# Patient Record
Sex: FEMALE | Race: WHITE | NOT HISPANIC OR LATINO | Employment: FULL TIME | URBAN - METROPOLITAN AREA
[De-identification: names, ages, dates, MRNs, and addresses within clinical notes are randomized per-mention and may not be internally consistent; named-entity substitution may affect disease eponyms.]

---

## 2017-02-16 ENCOUNTER — PATIENT MESSAGE (OUTPATIENT)
Dept: FAMILY MEDICINE | Facility: CLINIC | Age: 32
End: 2017-02-16

## 2017-03-02 ENCOUNTER — PATIENT OUTREACH (OUTPATIENT)
Dept: ADMINISTRATIVE | Facility: HOSPITAL | Age: 32
End: 2017-03-02

## 2017-03-14 ENCOUNTER — OFFICE VISIT (OUTPATIENT)
Dept: FAMILY MEDICINE | Facility: CLINIC | Age: 32
End: 2017-03-14
Payer: COMMERCIAL

## 2017-03-14 VITALS
RESPIRATION RATE: 18 BRPM | SYSTOLIC BLOOD PRESSURE: 110 MMHG | TEMPERATURE: 99 F | HEART RATE: 100 BPM | DIASTOLIC BLOOD PRESSURE: 72 MMHG | WEIGHT: 161.63 LBS | BODY MASS INDEX: 27.59 KG/M2 | HEIGHT: 64 IN

## 2017-03-14 DIAGNOSIS — N92.6 MISSED MENSES: Primary | ICD-10-CM

## 2017-03-14 DIAGNOSIS — Z34.90 PREGNANCY, UNSPECIFIED GESTATIONAL AGE: ICD-10-CM

## 2017-03-14 LAB
B-HCG UR QL: POSITIVE
CTP QC/QA: YES

## 2017-03-14 PROCEDURE — 1160F RVW MEDS BY RX/DR IN RCRD: CPT | Mod: S$GLB,,, | Performed by: FAMILY MEDICINE

## 2017-03-14 PROCEDURE — 99999 PR PBB SHADOW E&M-EST. PATIENT-LVL III: CPT | Mod: PBBFAC,,, | Performed by: FAMILY MEDICINE

## 2017-03-14 PROCEDURE — 81025 URINE PREGNANCY TEST: CPT | Mod: S$GLB,,, | Performed by: FAMILY MEDICINE

## 2017-03-14 PROCEDURE — 99213 OFFICE O/P EST LOW 20 MIN: CPT | Mod: S$GLB,,, | Performed by: FAMILY MEDICINE

## 2017-03-14 RX ORDER — FOLIC ACID 0.8 MG
800 TABLET ORAL DAILY
COMMUNITY

## 2017-03-14 RX ORDER — PLANT STANOL ESTER 450 MG
8000 TABLET ORAL DAILY
COMMUNITY

## 2017-03-14 NOTE — MR AVS SNAPSHOT
"    [facility] Medicine  [address] [address]  Phone: [phone]                  Nicki Luther   3/14/2017 10:15 AM   Office Visit    Description:  Female : 1985   Provider:  Meliza Arthur MD   Department:  [facility]           Reason for Visit     Possible Pregnancy           Diagnoses this Visit        Comments    Missed menses    -  Primary     Pregnancy, unspecified gestational age                To Do List           Future Appointments        Provider Department Dept Phone    3/16/2017 10:45 AM Malathi Nelson CNM [facility] OB/ -[phone]      Goals (5 Years of Data)     None      [facility]     [facility] On Call Nurse [facility] Line -  Assistance  Registered nurses in the [facility] On Call Center provide clinical advisement, health education, appointment booking, and other advisory services.  Call for this free service at [phone].             Medications           STOP taking these medications     benzonatate (TESSALON) 200 MG capsule 1 Q 8 hrs to suppress cough,or if not needed in AM prefer HS dosing only.           Verify that the below list of medications is an accurate representation of the medications you are currently taking.  If none reported, the list may be blank. If incorrect, please contact your healthcare provider. Carry this list with you in case of emergency.           Current Medications     DOCOSAHEXANOIC ACID (DHA ALGAL-900 ORAL) Take by mouth.    folic acid (FOLVITE) 800 MCG Tab Take 800 mcg by mouth once daily.    vitamin A 8000 UNIT capsule Take 8,000 Units by mouth once daily.           Clinical Reference Information           Your Vitals Were     BP Pulse Temp Resp Height Weight    110/72 100 98.7 °F (37.1 °C) (Tympanic) 18 5' 3.75" (1.619 m) 73.3 kg (161 lb 9.6 oz)    Last Period BMI             02/10/2017 27.96 kg/m2         Blood Pressure          Most Recent Value    BP  110/72    "   Allergies as of 3/14/2017     No Known Allergies      Immunizations Administered on Date of Encounter - 3/14/2017     None      Orders Placed During Today's Visit      Normal Orders This Visit    Ambulatory consult to Obstetrics / Gynecology     POCT Urine Pregnancy          3/14/2017 10:35 AM - Dayan Mena LPN      Component Results     Component Value Flag Ref Range Units Status    POC Preg Test, Ur Positive (A) Negative  Final     Acceptable Yes    Final            Smoking Cessation     If you would like to quit smoking:   You may be eligible for free services if you are a [place] resident and started smoking cigarettes before September 1, 1988.  Call the Smoking Cessation Trust ([facility]) toll free at (096) 411-7563 or (739) 510-0200.   Call 1-800-QUIT-NOW if you do not meet the above criteria.            Language Assistance Services     ATTENTION: Language assistance services are available, free of charge. Please call [phone].      ATENCIÓN: Si habla español, tiene a peters disposición servicios gratuitos de asistencia lingüística. Llame al [phone].     CHÚ Ý: N?u b?n nói Ti?ng Vi?t, có các d?ch v? h? tr? ngôn ng? mi?n phí dành cho b?n. G?i s? [phone].         [facility] complies with applicable Federal civil rights laws and does not discriminate on the basis of race, color, national origin, age, disability, or sex.

## 2017-03-14 NOTE — PROGRESS NOTES
CHIEF COMPLAINT: This is a 31-year-old female complaining of missed menses.    SUBJECTIVE: The patient reports that she had a light menstrual cycle on February 10, which was not usual for her.  Her last normal menstrual cycle was January 8 or 9.  She has lately been having cramping without spotting.  She took 3 home pregnancy tests, all of which were positive.  She complains of breast tenderness and intermittent nausea.  She denies weight gain or loss.    ROS:  GENERAL: Patient denies fever, chills, night sweats.  Patient denies weight gain or loss. Patient denies anorexia, fatigue, weakness or swollen glands.  SKIN: Patient denies rash.  HEENT: Patient denies sore throat, ear pain, hearing loss, nasal congestion, or runny nose. Patient denies visual disturbance, eye irritation or discharge.  LUNGS: Patient denies cough, wheeze or hemoptysis.  CARDIOVASCULAR: Patient denies chest pain, shortness of breath, palpitations, syncope or lower extremity edema.  GI: Patient denies abdominal pain, nausea, vomiting, diarrhea, constipation, blood in stool or melena.  GENITOURINARY: Patient denies pelvic pain, vaginal discharge, itch or odor. Patient denies irregular vaginal bleeding.  Patient denies dysuria, frequency, hematuria, nocturia, urgency or incontinence.    OBJECTIVE:   GENERAL: Well-developed well-nourished white female alert and oriented x3 in no acute distress.  Memory, judgment and cognition without deficit.  Hearing impaired.   present.  SKIN: Clear without rash.  Normal color and tone.  HEENT: Eyes: Clear conjunctivae.  No scleral icterus.    NECK: Supple, normal range of motion.    LUNGS: Clear to auscultation.  Normal respiratory effort.  CARDIOVASCULAR: Regular rhythm, normal S1, S2 without murmur, gallop or rub.  ABDOMEN: Normal appearance.  Active bowel sounds.  Soft, nontender without mass or organomegaly.  No rebound or guarding.  EXTREMITIES: Without cyanosis, clubbing or edema.  Distal  pulses 2+ and equal.  Normal range of motion in all extremities.  No joint effusion, erythema or warmth.    UPT: Positive.      ASSESSMENT:  1. Missed menses    2. Pregnancy, unspecified gestational age      PLAN:   1.  Continue folic acid.  2.  Avoid OTC analgesics except for Tylenol.  3.  May fly in April.  4.  May use meclizine for dizziness on airplane flight.  5.  OB consult.

## 2017-03-16 ENCOUNTER — LAB VISIT (OUTPATIENT)
Dept: LAB | Facility: HOSPITAL | Age: 32
End: 2017-03-16
Attending: NURSE PRACTITIONER
Payer: COMMERCIAL

## 2017-03-16 ENCOUNTER — TELEPHONE (OUTPATIENT)
Dept: OBSTETRICS AND GYNECOLOGY | Facility: CLINIC | Age: 32
End: 2017-03-16

## 2017-03-16 ENCOUNTER — OFFICE VISIT (OUTPATIENT)
Dept: OBSTETRICS AND GYNECOLOGY | Facility: CLINIC | Age: 32
End: 2017-03-16
Payer: COMMERCIAL

## 2017-03-16 VITALS
BODY MASS INDEX: 27.92 KG/M2 | RESPIRATION RATE: 16 BRPM | HEIGHT: 64 IN | WEIGHT: 163.56 LBS | DIASTOLIC BLOOD PRESSURE: 72 MMHG | SYSTOLIC BLOOD PRESSURE: 106 MMHG

## 2017-03-16 DIAGNOSIS — H91.90 HEARING LOSS, UNSPECIFIED HEARING LOSS TYPE, UNSPECIFIED LATERALITY: ICD-10-CM

## 2017-03-16 DIAGNOSIS — Z32.01 POSITIVE URINE PREGNANCY TEST: ICD-10-CM

## 2017-03-16 DIAGNOSIS — N91.1 SECONDARY PHYSIOLOGIC AMENORRHEA: Primary | ICD-10-CM

## 2017-03-16 LAB
ABO + RH BLD: NORMAL
B-HCG UR QL: POSITIVE
BASOPHILS # BLD AUTO: 0.02 K/UL
BASOPHILS NFR BLD: 0.3 %
BLD GP AB SCN CELLS X3 SERPL QL: NORMAL
CTP QC/QA: YES
DIFFERENTIAL METHOD: ABNORMAL
EOSINOPHIL # BLD AUTO: 0.1 K/UL
EOSINOPHIL NFR BLD: 1.1 %
ERYTHROCYTE [DISTWIDTH] IN BLOOD BY AUTOMATED COUNT: 12.6 %
HCT VFR BLD AUTO: 37.6 %
HGB BLD-MCNC: 12.7 G/DL
LYMPHOCYTES # BLD AUTO: 2.2 K/UL
LYMPHOCYTES NFR BLD: 36.2 %
MCH RBC QN AUTO: 31.4 PG
MCHC RBC AUTO-ENTMCNC: 33.8 %
MCV RBC AUTO: 93 FL
MONOCYTES # BLD AUTO: 0.5 K/UL
MONOCYTES NFR BLD: 8.6 %
NEUTROPHILS # BLD AUTO: 3.3 K/UL
NEUTROPHILS NFR BLD: 53.6 %
PLATELET # BLD AUTO: 232 K/UL
PMV BLD AUTO: 10.5 FL
RBC # BLD AUTO: 4.04 M/UL
WBC # BLD AUTO: 6.19 K/UL

## 2017-03-16 PROCEDURE — 86850 RBC ANTIBODY SCREEN: CPT

## 2017-03-16 PROCEDURE — 86900 BLOOD TYPING SEROLOGIC ABO: CPT

## 2017-03-16 PROCEDURE — 81025 URINE PREGNANCY TEST: CPT | Mod: S$GLB,,, | Performed by: ADVANCED PRACTICE MIDWIFE

## 2017-03-16 PROCEDURE — 99203 OFFICE O/P NEW LOW 30 MIN: CPT | Mod: S$GLB,,, | Performed by: ADVANCED PRACTICE MIDWIFE

## 2017-03-16 PROCEDURE — 87591 N.GONORRHOEAE DNA AMP PROB: CPT

## 2017-03-16 PROCEDURE — 86762 RUBELLA ANTIBODY: CPT

## 2017-03-16 PROCEDURE — 85025 COMPLETE CBC W/AUTO DIFF WBC: CPT

## 2017-03-16 PROCEDURE — 86703 HIV-1/HIV-2 1 RESULT ANTBDY: CPT

## 2017-03-16 PROCEDURE — 87086 URINE CULTURE/COLONY COUNT: CPT

## 2017-03-16 PROCEDURE — 88175 CYTOPATH C/V AUTO FLUID REDO: CPT

## 2017-03-16 PROCEDURE — 86592 SYPHILIS TEST NON-TREP QUAL: CPT

## 2017-03-16 PROCEDURE — 99999 PR PBB SHADOW E&M-EST. PATIENT-LVL III: CPT | Mod: PBBFAC,,, | Performed by: ADVANCED PRACTICE MIDWIFE

## 2017-03-16 PROCEDURE — 87340 HEPATITIS B SURFACE AG IA: CPT

## 2017-03-16 NOTE — TELEPHONE ENCOUNTER
----- Message from Owen Mcfadden sent at 3/16/2017 11:51 AM CDT -----  Contact: pt   States she is wanting to speak with a nurse asap rg her appt with provider. States she wants her 3 pm appt today back with MS Nelson and does not want to see Eliz Rodriguez and can be reached per pt email at [email or web address]     States she is going to show up at 3 for an appt today . Pt is informed that she doesn't have a 3 pm appt and states she is not coming in for the 2 pm appt. Pt was informed that ms Nelson is out today and states she wants to see anyone but ms rodriguez

## 2017-03-16 NOTE — LETTER
March 16, 2017      Meliza Arthur MD  [address]  [place] [address]           [facility] - OB/ GYN  [address] [facility] [address] [facility] [address]  [place] [address]  Phone: [phone]  Fax: [phone]          Patient: Nicki Luther   MR Number: [id number]   YOB: 1985   Date of Visit: 3/16/2017       Dear Dr. Meliza Arthur:    Thank you for referring Nicki Luther to me for evaluation. Attached you will find relevant portions of my assessment and plan of care.    If you have questions, please do not hesitate to call me. I look forward to following Nicki Luther along with you.    Sincerely,    Eliz Rodriguez CNM    Enclosure  CC:  No Recipients    If you would like to receive this communication electronically, please contact [email or web address] or [phone] to request more information on [employer] access.    For providers and/or their staff who would like to refer a patient to [facility], please contact us through our one-stop-shop provider referral line, [facility], at [phone].    If you feel you have received this communication in error or would no longer like to receive these types of communications, please e-mail [email or web address]

## 2017-03-16 NOTE — TELEPHONE ENCOUNTER
Attempted to contact patient, not available. Left patient a voicemail message to call the clinic back with .

## 2017-03-16 NOTE — MR AVS SNAPSHOT
"    [facility] OB/ GYN  [address]  [place] [address]  Phone: [phone]  Fax: [phone]                  Nicki Luther   3/16/2017 2:00 PM   Office Visit    Description:  Female : 1985   Provider:  Eliz Rodriguez CNM   Department:  [facility] - [facility]/ G[facility]           Reason for Visit     Possible Pregnancy           Diagnoses this Visit        Comments    Secondary physiologic amenorrhea    -  Primary     Positive urine pregnancy test                To Do List           Future Appointments        Provider Department Dept Phone    2017 8:00 AM ULTRASOUND, OB-GYN [facility] OB/ -[phone]    2017 8:30 AM Eliz Rodriguez CNM [facility] OB/ -[phone]      Goals (5 Years of Data)     None      [facility]     [facility] On Call Nurse Care Line -  Assistance  Registered nurses in the [facility] On Call Center provide clinical advisement, health education, appointment booking, and other advisory services.  Call for this free service at [phone].             Medications                Verify that the below list of medications is an accurate representation of the medications you are currently taking.  If none reported, the list may be blank. If incorrect, please contact your healthcare provider. Carry this list with you in case of emergency.           Current Medications     DOCOSAHEXANOIC ACID (DHA ALGAL-900 ORAL) Take by mouth.    folic acid (FOLVITE) 800 MCG Tab Take 800 mcg by mouth once daily.    vitamin A 8000 UNIT capsule Take 8,000 Units by mouth once daily.           Clinical Reference Information           Your Vitals Were     BP Resp Height Weight Last Period BMI    106/72 (BP Location: Left arm, Patient Position: Sitting, BP Method: Manual) 16 5' 3.75" (1.619 m) 74.2 kg (163 lb 9.3 oz) 2017 28.3 kg/m2      Blood Pressure          Most Recent Value    BP  106/72      Allergies as of 3/16/2017     No Known Allergies      Immunizations Administered on Date " of Encounter - 3/16/2017     None      Orders Placed During Today's Visit      Normal Orders This Visit    C. trachomatis/N. gonorrhoeae by AMP DNA Cervix     Liquid-based pap smear, screening     POCT urine pregnancy     Urine culture     Future Labs/Procedures Expected by Expires    CBC auto differential  3/16/2017 3/16/2018    Hepatitis B surface antigen  3/16/2017 3/16/2018    HIV-1 and HIV-2 antibodies  3/16/2017 3/16/2018    RPR  3/16/2017 3/16/2018    Rubella antibody, IgG  3/16/2017 5/15/2018    Type & Screen - Ob Profile  As directed 3/16/2018    US OB/GYN Procedure ([facility])  As directed 3/16/2018      Language Assistance Services     ATTENTION: Language assistance services are available, free of charge. Please call [phone].      ATENCIÓN: Si habla fidelañol, tiene a peters disposición servicios gratuitos de asistencia lingüística. Llame al [phone].     CHÚ Ý: N?u b?n nói Ti?ng Vi?t, có các d?ch v? h? tr? ngôn ng? mi?n phí dành cho b?n. G?i s? [phone].         [facility] - [facility]/ [facility] complies with applicable Federal civil rights laws and does not discriminate on the basis of race, color, national origin, age, disability, or sex.

## 2017-03-16 NOTE — PROGRESS NOTES
"Subjective:      Nicki Luther is a 31 y.o. female who presents for evaluation of amenorrhea. She believes she could be pregnant. Pregnancy is desired. Sexual Activity: single partner, contraception: none. Current symptoms also include: positive home pregnancy test. Last period was normal.     Patient's last menstrual period was 02/01/2017.  The following portions of the patient's history were reviewed and updated as appropriate: allergies, current medications, past family history, past medical history, past social history, past surgical history and problem list.    Review of Systems  A comprehensive review of systems was negative except for: Constitutional: positive for fatigue       Objective:      /72 (BP Location: Left arm, Patient Position: Sitting, BP Method: Manual)  Resp 16  Ht 5' 3.75" (1.619 m)  Wt 74.2 kg (163 lb 9.3 oz)  LMP 02/01/2017  BMI 28.3 kg/m2  General Appearance:    Alert, cooperative, no distress, appears stated age   Head:    Normocephalic, without obvious abnormality, atraumatic   Neck:   Supple, symmetrical, trachea midline, no adenopathy;     thyroid:  no enlargement/tenderness/nodules   Back:     Symmetric, no curvature, ROM normal, no CVA tenderness   Lungs:     Clear to auscultation bilaterally, respirations unlabored    Heart:    Regular rate and rhythm, S1 and S2 normal, no murmur   Breast Exam:    Skin soft and clear, no puckering noted. Nipples centrally placed without discharge or pain. No masses palpated in breast tissue or axilla. Educated on self breast exams.   Abdomen:     Soft, non-tender, bowel sounds active all four quadrants,     no masses, no organomegaly   Genitalia:   Vulva normal in appearance, no rash or lesions noted. Vagina moist, with adequate ruggae, pink in appearance, no tenderness or lesions noted, no discharge noted. Cervix closed, pink, without bleeding or discharge. Specimens collected.  Bimanual exam: no CMT, uterus palpated approx 8 wk " size, ovaries not palpated. Good vaginal tone noted. Rectal exam deferred.     Extremities:   Extremities normal, atraumatic, no cyanosis or edema   Pulses:   2+ and symmetric all extremities   Skin:   Skin color, texture, turgor normal, no rashes or lesions       Lab Review  Urine HCG: positive      Assessment:      Absence of menstruation.       Plan:      Pregnancy Test: Positive: EDC: 17. Briefly discussed pre-reginaldo care options. Pregnancy, Childbirth and the [place] book given. Encouraged well-balanced diet, plenty of rest when needed, pre- vitamins daily and walking for exercise. Discussed self-help for nausea, avoiding OTC medications until consulting provider or pharmacist, other than Tylenol as needed, minimal caffeine (1-2 cups daily) and avoiding alcohol. She will schedule her initial OB visit in the next month with her PCP or OB provider. Feel free to call with any questions. .

## 2017-03-17 ENCOUNTER — PATIENT MESSAGE (OUTPATIENT)
Dept: OBSTETRICS AND GYNECOLOGY | Facility: CLINIC | Age: 32
End: 2017-03-17

## 2017-03-17 LAB
HBV SURFACE AG SERPL QL IA: NEGATIVE
HIV 1+2 AB+HIV1 P24 AG SERPL QL IA: NEGATIVE
RPR SER QL: NORMAL
RUBV IGG SER-ACNC: 30.9 IU/ML
RUBV IGG SER-IMP: REACTIVE

## 2017-03-19 LAB
BACTERIA UR CULT: NORMAL
BACTERIA UR CULT: NORMAL

## 2017-03-20 ENCOUNTER — PATIENT MESSAGE (OUTPATIENT)
Dept: OBSTETRICS AND GYNECOLOGY | Facility: CLINIC | Age: 32
End: 2017-03-20

## 2017-03-20 LAB
C TRACH DNA SPEC QL NAA+PROBE: NEGATIVE
N GONORRHOEA DNA SPEC QL NAA+PROBE: NEGATIVE

## 2017-03-24 ENCOUNTER — PATIENT MESSAGE (OUTPATIENT)
Dept: OBSTETRICS AND GYNECOLOGY | Facility: CLINIC | Age: 32
End: 2017-03-24

## 2017-03-25 ENCOUNTER — PATIENT MESSAGE (OUTPATIENT)
Dept: OBSTETRICS AND GYNECOLOGY | Facility: CLINIC | Age: 32
End: 2017-03-25

## 2017-04-07 ENCOUNTER — INITIAL PRENATAL (OUTPATIENT)
Dept: OBSTETRICS AND GYNECOLOGY | Facility: CLINIC | Age: 32
End: 2017-04-07
Payer: COMMERCIAL

## 2017-04-07 ENCOUNTER — PROCEDURE VISIT (OUTPATIENT)
Dept: OBSTETRICS AND GYNECOLOGY | Facility: CLINIC | Age: 32
End: 2017-04-07
Payer: COMMERCIAL

## 2017-04-07 VITALS
BODY MASS INDEX: 28.22 KG/M2 | DIASTOLIC BLOOD PRESSURE: 66 MMHG | SYSTOLIC BLOOD PRESSURE: 106 MMHG | WEIGHT: 163.13 LBS

## 2017-04-07 DIAGNOSIS — Z34.01 ENCOUNTER FOR SUPERVISION OF NORMAL FIRST PREGNANCY IN FIRST TRIMESTER: Primary | ICD-10-CM

## 2017-04-07 DIAGNOSIS — R42 VERTIGO: ICD-10-CM

## 2017-04-07 DIAGNOSIS — Z32.01 POSITIVE URINE PREGNANCY TEST: ICD-10-CM

## 2017-04-07 PROCEDURE — 0500F INITIAL PRENATAL CARE VISIT: CPT | Mod: S$GLB,,, | Performed by: ADVANCED PRACTICE MIDWIFE

## 2017-04-07 PROCEDURE — 99999 PR PBB SHADOW E&M-EST. PATIENT-LVL II: CPT | Mod: PBBFAC,,, | Performed by: ADVANCED PRACTICE MIDWIFE

## 2017-04-07 PROCEDURE — 76801 OB US < 14 WKS SINGLE FETUS: CPT | Mod: S$GLB,,, | Performed by: OBSTETRICS & GYNECOLOGY

## 2017-04-07 RX ORDER — SCOLOPAMINE TRANSDERMAL SYSTEM 1 MG/1
1 PATCH, EXTENDED RELEASE TRANSDERMAL
Qty: 4 PATCH | Refills: 1 | Status: SHIPPED | OUTPATIENT
Start: 2017-04-07 | End: 2017-07-04 | Stop reason: SDUPTHER

## 2017-04-07 RX ORDER — MECLIZINE HYDROCHLORIDE 25 MG/1
25 TABLET ORAL 3 TIMES DAILY PRN
Qty: 30 TABLET | Refills: 0 | Status: SHIPPED | OUTPATIENT
Start: 2017-04-07 | End: 2022-04-13 | Stop reason: ALTCHOICE

## 2017-04-07 NOTE — MR AVS SNAPSHOT
[facility] - [facility]/ GYN  [address]  [place] [address]  Phone: [phone]  Fax: [phone]                  Nicki Luther   2017 8:30 AM   Initial Prenatal    Description:  Female : 1985   Provider:  Eliz Rodriguez CNM   Department:  [facility] - [facility]/ [facility]           Reason for Visit     Initial Prenatal Visit     Constipation     Dizziness           Diagnoses this Visit        Comments    Encounter for supervision of normal first pregnancy in first trimester    -  Primary     Vertigo                To Do List           Future Appointments        Provider Department Dept Phone    2017 4:30 PM Eliz Rodriguez CNM [facility] - OB/ -[phone]      Goals (5 Years of Data)     None      Follow-Up and Disposition     Return in about 4 weeks (around 2017).    Follow-up and Disposition History       These Medications        Disp Refills Start End    meclizine (ANTIVERT) 25 mg tablet 30 tablet 0 2017     Take 1 tablet (25 mg total) by mouth 3 (three) times daily as needed. - Oral    Pharmacy: [facility] [address] [place] [address] [place] [address] [facility] Ph #: [phone]       scopolamine (TRANSDERM-SCOP) 1.5 mg (1 mg over 3 days) 4 patch 1 2017     Place 1 patch (1.5 mg total) onto the skin every 72 hours. - Transdermal    Pharmacy: [facility] [address] [place] [address] [place] [facility] [address] [facility] Ph #: [phone]         [facility] On Call     [facility] On Call Nurse Care Line -  Assistance  Unless otherwise directed by your provider, please contact [facility], our nurse care line that is available for  assistance.     Registered nurses in the [facility] On Call Center provide: appointment scheduling, clinical advisement, health education, and other advisory services.  Call: [phone] (toll free)               Medications           START taking these NEW medications        Refills     meclizine (ANTIVERT) 25 mg tablet 0    Sig: Take 1 tablet (25 mg total) by mouth 3 (three) times daily as needed.    Class: Normal    Route: Oral    scopolamine (TRANSDERM-SCOP) 1.5 mg (1 mg over 3 days) 1    Sig: Place 1 patch (1.5 mg total) onto the skin every 72 hours.    Class: Normal    Route: Transdermal           Verify that the below list of medications is an accurate representation of the medications you are currently taking.  If none reported, the list may be blank. If incorrect, please contact your healthcare provider. Carry this list with you in case of emergency.           Current Medications     DOCOSAHEXANOIC ACID (DHA ALGAL-900 ORAL) Take by mouth.    folic acid (FOLVITE) 800 MCG Tab Take 800 mcg by mouth once daily.    vitamin A 8000 UNIT capsule Take 8,000 Units by mouth once daily.    meclizine (ANTIVERT) 25 mg tablet Take 1 tablet (25 mg total) by mouth 3 (three) times daily as needed.    scopolamine (TRANSDERM-SCOP) 1.5 mg (1 mg over 3 days) Place 1 patch (1.5 mg total) onto the skin every 72 hours.           Clinical Reference Information           Prenatal Vitals     Enc. Date GA Prenatal Vitals Prenatal Pulse Pain Level Urine Albumin/Glucose Edema Presentation Dilation/Effacement/Station    4/7/17 8w2d 106/66 / 74 kg (163 lb 2.3 oz)             Your Vitals Were     BP Weight Last Period BMI       106/66 74 kg (163 lb 2.3 oz) 02/01/2017 28.22 kg/m2       Allergies as of 4/7/2017     No Known Allergies      Immunizations Administered on Date of Encounter - 4/7/2017     None      Language Assistance Services     ATTENTION: Language assistance services are available, free of charge. Please call [phone].      ATENCIÓN: Si habla español, tiene a peters disposición servicios gratuitos de asistencia lingüística. Llame al [phone].     [facility] Ý: N?u b?n nói Ti?ng Vi?t, có các d?ch v? h? tr? ngôn ng? mi?n phí dành cho b?n. G?i s? [phone].         [facility] - [facility]/ [facility] complies with applicable  Federal civil rights laws and does not discriminate on the basis of race, color, national origin, age, disability, or sex.

## 2017-04-07 NOTE — PROGRESS NOTES
Subjective:      Nicki Luther is being seen today for her first obstetrical visit.  This is a planned pregnancy. She is at 8w2d gestation. 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 Relationship with FOB: spouse, living together. Patient does intend to breast feed. Pregnancy history fully reviewed.    Menstrual History:  OB History      Para Term  AB TAB SAB Ectopic Multiple Living    1                  Patient's last menstrual period was 2017.       The following portions of the patient's history were reviewed and updated as appropriate: allergies, current medications, past family history, past medical history, past social history, past surgical history and problem list.    Review of Systems  A comprehensive review of systems was negative except for: Constitutional: positive for fatigue  Gastrointestinal: positive for constipation      Objective:      No exam performed today, keshav last visit.      Assessment:      Pregnancy at 8 and 2/7 weeks      Plan:      Initial labs reviewed.  Constipation: rec made  Vertigo with flying (going to [place] [employer] [place] on honeymoon tonight): scopolamine, meclizine PRN  Prenatal vitamins.  Problem list reviewed and updated.  AFP3 discussed: undecided.  Role of ultrasound in pregnancy discussed; fetal survey: results reviewed.  Amniocentesis discussed: not indicated.  Follow up in 4 weeks.  100% of 30 min visit spent on counseling and coordination of care.

## 2017-05-11 ENCOUNTER — ROUTINE PRENATAL (OUTPATIENT)
Dept: OBSTETRICS AND GYNECOLOGY | Facility: CLINIC | Age: 32
End: 2017-05-11
Payer: COMMERCIAL

## 2017-05-11 ENCOUNTER — PATIENT MESSAGE (OUTPATIENT)
Dept: OBSTETRICS AND GYNECOLOGY | Facility: CLINIC | Age: 32
End: 2017-05-11

## 2017-05-11 VITALS
WEIGHT: 167.13 LBS | BODY MASS INDEX: 28.91 KG/M2 | SYSTOLIC BLOOD PRESSURE: 110 MMHG | DIASTOLIC BLOOD PRESSURE: 76 MMHG

## 2017-05-11 DIAGNOSIS — Z34.02 ENCOUNTER FOR SUPERVISION OF NORMAL FIRST PREGNANCY IN SECOND TRIMESTER: Primary | ICD-10-CM

## 2017-05-11 DIAGNOSIS — G25.81 RESTLESS LEG SYNDROME: Primary | ICD-10-CM

## 2017-05-11 PROCEDURE — 99999 PR PBB SHADOW E&M-EST. PATIENT-LVL II: CPT | Mod: PBBFAC,,, | Performed by: ADVANCED PRACTICE MIDWIFE

## 2017-05-11 PROCEDURE — 0502F SUBSEQUENT PRENATAL CARE: CPT | Mod: S$GLB,,, | Performed by: ADVANCED PRACTICE MIDWIFE

## 2017-05-11 NOTE — PROGRESS NOTES
Doing well, only c/o is restless leg syndrome. Had it before and took meds, but concerned about meds in pregnancy. Explained that we can't use any prescription meds for this but I will do more research. Unable to hear FHTs with doppler, but US showed 140s with good fetal movement.   Reviewed labs, all normal  Discussed Quad screen, pt undecided.

## 2017-05-11 NOTE — PATIENT INSTRUCTIONS
Alpha-Fetoprotein (Blood)  Does this test have other names?  msAFP screen  What is this test?  If you are pregnant, this test looks for a fetal substance called alpha-fetoprotein (AFP) in your blood.  AFP is a protein made by your fetus' liver. The protein passes through the placenta and into your blood. The test helps find out whether your fetus has higher than normal levels of AFP.  Higher levels of AFP may mean that your fetus has an abnormality, such as a neural tube defect or Down syndrome. Neural tube defects are serious birth defects in which the spinal cord and brain don't properly develop. If a fetus has this defect, it will probably have an opening in its head, spine, or stomach wall that causes high levels of AFP to travel into the mother's blood.  Down syndrome is an abnormality involving a chromosome.  Why do I need this test?  If you are pregnant, your health care provider will recommend this test to look at AFP levels in your blood and find out your fetus' health. You may have this test during weeks 15 to 20 of your pregnancy. You may have this test to see whether your fetus has signs of Down syndrome or other birth defects.  What other tests might I have along with this test?  Your health care provider may also order blood tests to help make an accurate diagnosis. These tests include:  · hCG  · Unconjugated estriol  · Inhibin A  You may have the following tests if your msAFP results are positive:  · Ultrasound  · Amniocentesis, which looks at the fluid around your fetus  What do my test results mean?  Many things may affect your lab test results. These include the method each lab uses to do the test. Even if your test results are different from the normal value, you may not have a problem. To learn what the results mean for you, talk with your health care provider.  Results are given in nanograms per milliliter (ng/mL). Here is the normal range:  · For adults: less than 40 ng/mL  · For a child  younger than 1 year: less than 30 ng/mL  AFP levels typically rise until around the 12th week of pregnancy. Then the levels drop consistently until birth.  Other causes of increased levels of AFP may include:  · Primary hepatocellular carcinoma, a type of liver cancer  · Rapidly developing hepatitis, or liver inflammation, which may cause AFP levels to rise beyond 1,000 ng/mL  · Lower levels of hepatitis, which may cause AFP levels of 100 to 400 ng/mL  · Rare occurrences of cancer that has spread from your gastrointestinal tract  · Cholangiocarcinoma, a type of cancer that can cause AFP levels greater than 400 ng/mL  If your fetus has Down syndrome, substances like AFP and unconjugated estriol will likely be low. But hCG and inhibin A levels will probably be high.  If you have a positive test result, it does not mean that your fetus definitely has a defect. Most pregnant mothers who test positive are actually carrying a healthy fetus.  How is this test done?  The test requires a blood sample, which is drawn through a needle from a vein in your arm.  Does this test pose any risks?  Taking a blood sample with a needle carries risks that include bleeding, infection, bruising, or feeling dizzy. When the needle pricks your arm, you may feel a slight stinging sensation or pain. Afterward, the site may be slightly sore.  What might affect my test results?  If you have recently been given a screening test for embryonic teratocarcinoma or hepatoblastoma, a liver tumor, your AFP levels may be higher than normal.  How do I get ready for this test?  You don't need to prepare for this test. But be sure your health care provider knows about all medicines, herbs, vitamins, and supplements you are taking. This includes medicines that don't need a prescription and any illicit drugs you may use.  Date Last Reviewed: 5/21/2015  © 7548-7688 [employer]. [address] [place] [address], [place] [address]. All rights reserved. This  information is not intended as a substitute for professional medical care. Always follow your healthcare professional's instructions.

## 2017-05-11 NOTE — MR AVS SNAPSHOT
[facility]  [address] [facility] [address]  [place] [address]  Phone: [phone]  Fax: [phone]                  Nicki Luther   2017 4:30 PM   Routine Prenatal    Description:  Female : 1985   Provider:  Eliz Rodriguez CNM   Department:  [facility]           Reason for Visit     Routine Prenatal Visit           Diagnoses this Visit        Comments    Encounter for supervision of normal first pregnancy in second trimester    -  Primary            To Do List           Future Appointments        Provider Department Dept Phone    2017 1:00 PM Eliz Rodriguez CNM [facility]/ -[phone]      Goals (5 Years of Data)     None      Follow-Up and Disposition     Return in about 4 weeks (around 2017).      [facility]     [facility] Nurse Care Line -  Assistance  Unless otherwise directed by your provider, please contact [facility], our nurse care line that is available for  assistance.     Registered nurses in the [facility] provide: appointment scheduling, clinical advisement, health education, and other advisory services.  Call: [phone] (toll free)               Medications                Verify that the below list of medications is an accurate representation of the medications you are currently taking.  If none reported, the list may be blank. If incorrect, please contact your healthcare provider. Carry this list with you in case of emergency.           Current Medications     DOCOSAHEXANOIC ACID (DHA ALGAL-900 ORAL) Take by mouth.    folic acid (FOLVITE) 800 MCG Tab Take 800 mcg by mouth once daily.    meclizine (ANTIVERT) 25 mg tablet Take 1 tablet (25 mg total) by mouth 3 (three) times daily as needed.    scopolamine (TRANSDERM-SCOP) 1.5 mg (1 mg over 3 days) Place 1 patch (1.5 mg total) onto the skin every 72 hours.    vitamin A 8000 UNIT capsule Take 8,000 Units by mouth once daily.           Clinical Reference Information            Prenatal Vitals     Enc. Date GA Prenatal Vitals Prenatal Pulse Pain Level Urine Albumin/Glucose Edema Presentation Dilation/Effacement/Station    4/7/17 8w2d 106/66 / 74 kg (163 lb 2.3 oz)             Your Vitals Were     Last Period                   02/01/2017           Allergies as of 5/11/2017     No Known Allergies      Immunizations Administered on Date of Encounter - 5/11/2017     None      Language Assistance Services     ATTENTION: Language assistance services are available, free of charge. Please call [phone].      ATENCIÓN: Si habla william, tiene a peters disposición servicios gratuitos de asistencia lingüística. Llame al [phone].     CHÚ Ý: N?u b?n nói Ti?ng Vi?t, có các d?ch v? h? tr? ngôn ng? mi?n phí dành cho b?n. G?i s? [phone].         [facility] complies with applicable Federal civil rights laws and does not discriminate on the basis of race, color, national origin, age, disability, or sex.

## 2017-05-21 ENCOUNTER — PATIENT MESSAGE (OUTPATIENT)
Dept: OBSTETRICS AND GYNECOLOGY | Facility: CLINIC | Age: 32
End: 2017-05-21

## 2017-06-08 ENCOUNTER — ROUTINE PRENATAL (OUTPATIENT)
Dept: OBSTETRICS AND GYNECOLOGY | Facility: CLINIC | Age: 32
End: 2017-06-08
Payer: COMMERCIAL

## 2017-06-08 VITALS
BODY MASS INDEX: 29.21 KG/M2 | WEIGHT: 168.88 LBS | SYSTOLIC BLOOD PRESSURE: 120 MMHG | DIASTOLIC BLOOD PRESSURE: 72 MMHG

## 2017-06-08 DIAGNOSIS — Z36.89 ENCOUNTER FOR FETAL ANATOMIC SURVEY: ICD-10-CM

## 2017-06-08 DIAGNOSIS — Z34.02 ENCOUNTER FOR SUPERVISION OF NORMAL FIRST PREGNANCY IN SECOND TRIMESTER: Primary | ICD-10-CM

## 2017-06-08 PROCEDURE — 99999 PR PBB SHADOW E&M-EST. PATIENT-LVL II: CPT | Mod: PBBFAC,,, | Performed by: ADVANCED PRACTICE MIDWIFE

## 2017-06-08 PROCEDURE — 0502F SUBSEQUENT PRENATAL CARE: CPT | Mod: S$GLB,,, | Performed by: ADVANCED PRACTICE MIDWIFE

## 2017-06-08 NOTE — PROGRESS NOTES
Doing great, restless leg improved, no nausea. No fetal movement yet, reassured. Declines genetic testing. Anatomy scan next visit, does NOT want to know gender.    Coffective counseling sheet Build Your Team discussed with mother. Reinforced importance of early identification of support team including champion, OB provider, pediatrician and local community resources. Encouraged mother to download [employer] mobile serina if she has not already done so.  Mother verbalizes understanding.

## 2017-06-12 ENCOUNTER — PATIENT MESSAGE (OUTPATIENT)
Dept: OBSTETRICS AND GYNECOLOGY | Facility: CLINIC | Age: 32
End: 2017-06-12

## 2017-06-20 ENCOUNTER — PATIENT MESSAGE (OUTPATIENT)
Dept: OBSTETRICS AND GYNECOLOGY | Facility: CLINIC | Age: 32
End: 2017-06-20

## 2017-06-29 ENCOUNTER — PROCEDURE VISIT (OUTPATIENT)
Dept: OBSTETRICS AND GYNECOLOGY | Facility: CLINIC | Age: 32
End: 2017-06-29
Payer: COMMERCIAL

## 2017-06-29 ENCOUNTER — ROUTINE PRENATAL (OUTPATIENT)
Dept: OBSTETRICS AND GYNECOLOGY | Facility: CLINIC | Age: 32
End: 2017-06-29
Payer: COMMERCIAL

## 2017-06-29 VITALS
SYSTOLIC BLOOD PRESSURE: 118 MMHG | DIASTOLIC BLOOD PRESSURE: 70 MMHG | BODY MASS INDEX: 29.98 KG/M2 | WEIGHT: 173.31 LBS

## 2017-06-29 DIAGNOSIS — O99.612 CONSTIPATION IN PREGNANCY IN SECOND TRIMESTER: ICD-10-CM

## 2017-06-29 DIAGNOSIS — Z36.89 ENCOUNTER FOR FETAL ANATOMIC SURVEY: ICD-10-CM

## 2017-06-29 DIAGNOSIS — Z34.02 ENCOUNTER FOR SUPERVISION OF NORMAL FIRST PREGNANCY IN SECOND TRIMESTER: Primary | ICD-10-CM

## 2017-06-29 DIAGNOSIS — K59.00 CONSTIPATION IN PREGNANCY IN SECOND TRIMESTER: ICD-10-CM

## 2017-06-29 PROCEDURE — 99999 PR PBB SHADOW E&M-EST. PATIENT-LVL III: CPT | Mod: PBBFAC,,, | Performed by: ADVANCED PRACTICE MIDWIFE

## 2017-06-29 PROCEDURE — 76805 OB US >/= 14 WKS SNGL FETUS: CPT | Mod: S$GLB,,, | Performed by: OBSTETRICS & GYNECOLOGY

## 2017-06-29 PROCEDURE — 0502F SUBSEQUENT PRENATAL CARE: CPT | Mod: S$GLB,,, | Performed by: ADVANCED PRACTICE MIDWIFE

## 2017-06-29 NOTE — PROGRESS NOTES
Doing well, only c/o is consitpaition. Rec made for miralax, already doing stool softeners and fiber supplements.  US today showed posterior placenta, 3VC, normal fluid   G, 14 oz, normal anatomy except suboptimal heart and legs, will rescan next visit.   Going to [place] for wedding, records given for trip.    Coffective counseling sheet Get Ready discussed with mother. Reinforced avoiding induction of labor unless medically indicated as well as comfort measures during labor.  Encouraged mother to download [employer] mobile serina if she has not already done so. Mother verbalizes understanding.

## 2017-07-04 ENCOUNTER — PATIENT MESSAGE (OUTPATIENT)
Dept: OBSTETRICS AND GYNECOLOGY | Facility: CLINIC | Age: 32
End: 2017-07-04

## 2017-07-04 DIAGNOSIS — R42 VERTIGO: ICD-10-CM

## 2017-07-04 RX ORDER — SCOLOPAMINE TRANSDERMAL SYSTEM 1 MG/1
1 PATCH, EXTENDED RELEASE TRANSDERMAL
Qty: 4 PATCH | Refills: 1 | Status: ON HOLD | OUTPATIENT
Start: 2017-07-04 | End: 2022-03-04 | Stop reason: HOSPADM

## 2017-07-21 ENCOUNTER — PATIENT MESSAGE (OUTPATIENT)
Dept: OBSTETRICS AND GYNECOLOGY | Facility: CLINIC | Age: 32
End: 2017-07-21

## 2017-07-21 ENCOUNTER — TELEPHONE (OUTPATIENT)
Dept: OBSTETRICS AND GYNECOLOGY | Facility: CLINIC | Age: 32
End: 2017-07-21

## 2017-07-24 ENCOUNTER — ROUTINE PRENATAL (OUTPATIENT)
Dept: OBSTETRICS AND GYNECOLOGY | Facility: CLINIC | Age: 32
End: 2017-07-24
Payer: COMMERCIAL

## 2017-07-24 ENCOUNTER — PROCEDURE VISIT (OUTPATIENT)
Dept: OBSTETRICS AND GYNECOLOGY | Facility: CLINIC | Age: 32
End: 2017-07-24
Payer: COMMERCIAL

## 2017-07-24 VITALS
DIASTOLIC BLOOD PRESSURE: 84 MMHG | BODY MASS INDEX: 31.05 KG/M2 | SYSTOLIC BLOOD PRESSURE: 131 MMHG | WEIGHT: 179.44 LBS

## 2017-07-24 DIAGNOSIS — Z34.02 ENCOUNTER FOR SUPERVISION OF NORMAL FIRST PREGNANCY IN SECOND TRIMESTER: Primary | ICD-10-CM

## 2017-07-24 DIAGNOSIS — Z36.89 ENCOUNTER FOR FETAL ANATOMIC SURVEY: ICD-10-CM

## 2017-07-24 PROCEDURE — 0502F SUBSEQUENT PRENATAL CARE: CPT | Mod: S$GLB,,, | Performed by: ADVANCED PRACTICE MIDWIFE

## 2017-07-24 PROCEDURE — 99999 PR PBB SHADOW E&M-EST. PATIENT-LVL II: CPT | Mod: PBBFAC,,, | Performed by: ADVANCED PRACTICE MIDWIFE

## 2017-07-24 PROCEDURE — 76816 OB US FOLLOW-UP PER FETUS: CPT | Mod: S$GLB,,, | Performed by: OBSTETRICS & GYNECOLOGY

## 2017-07-24 NOTE — PROGRESS NOTES
Doing well, denies problems. Starting to have regular fetal movement. Denies bleeding or contractions. US today showed anatomy complete, posterior placenta, breech position,  G 1 lb 9 oz, 61% normal eart and legs.   TDAP info given, 28 week labs in 2 weeks (prior to school starting).    Coffective counseling sheet Fall In Love discussed with mother. Reinforced immediate skin to skin, the magic first hour, importance of the first feeding and delaying routine procedures. Encouraged mother to download [employer] mobile serina if she has not already done so. Mother verbalizes understanding.

## 2017-08-11 ENCOUNTER — LAB VISIT (OUTPATIENT)
Dept: LAB | Facility: HOSPITAL | Age: 32
End: 2017-08-11
Attending: ADVANCED PRACTICE MIDWIFE
Payer: COMMERCIAL

## 2017-08-11 ENCOUNTER — ROUTINE PRENATAL (OUTPATIENT)
Dept: OBSTETRICS AND GYNECOLOGY | Facility: CLINIC | Age: 32
End: 2017-08-11
Payer: COMMERCIAL

## 2017-08-11 VITALS — SYSTOLIC BLOOD PRESSURE: 102 MMHG | WEIGHT: 184.94 LBS | DIASTOLIC BLOOD PRESSURE: 70 MMHG | BODY MASS INDEX: 32 KG/M2

## 2017-08-11 DIAGNOSIS — Z34.02 ENCOUNTER FOR SUPERVISION OF NORMAL FIRST PREGNANCY IN SECOND TRIMESTER: Primary | ICD-10-CM

## 2017-08-11 DIAGNOSIS — Z34.02 ENCOUNTER FOR SUPERVISION OF NORMAL FIRST PREGNANCY IN SECOND TRIMESTER: ICD-10-CM

## 2017-08-11 DIAGNOSIS — Z23 NEED FOR TDAP VACCINATION: ICD-10-CM

## 2017-08-11 LAB
BASOPHILS # BLD AUTO: 0.02 K/UL
BASOPHILS NFR BLD: 0.3 %
DIFFERENTIAL METHOD: ABNORMAL
EOSINOPHIL # BLD AUTO: 0.1 K/UL
EOSINOPHIL NFR BLD: 1 %
ERYTHROCYTE [DISTWIDTH] IN BLOOD BY AUTOMATED COUNT: 13.2 %
GLUCOSE SERPL-MCNC: 128 MG/DL
HCT VFR BLD AUTO: 35.7 %
HGB BLD-MCNC: 11.9 G/DL
LYMPHOCYTES # BLD AUTO: 1.5 K/UL
LYMPHOCYTES NFR BLD: 19.5 %
MCH RBC QN AUTO: 30.9 PG
MCHC RBC AUTO-ENTMCNC: 33.3 G/DL
MCV RBC AUTO: 93 FL
MONOCYTES # BLD AUTO: 0.4 K/UL
MONOCYTES NFR BLD: 5.3 %
NEUTROPHILS # BLD AUTO: 5.8 K/UL
NEUTROPHILS NFR BLD: 73.1 %
PLATELET # BLD AUTO: 253 K/UL
PMV BLD AUTO: 10.3 FL
RBC # BLD AUTO: 3.85 M/UL
WBC # BLD AUTO: 7.9 K/UL

## 2017-08-11 PROCEDURE — 86592 SYPHILIS TEST NON-TREP QUAL: CPT

## 2017-08-11 PROCEDURE — 82950 GLUCOSE TEST: CPT

## 2017-08-11 PROCEDURE — 90715 TDAP VACCINE 7 YRS/> IM: CPT | Mod: S$GLB,,, | Performed by: ADVANCED PRACTICE MIDWIFE

## 2017-08-11 PROCEDURE — 0502F SUBSEQUENT PRENATAL CARE: CPT | Mod: S$GLB,,, | Performed by: ADVANCED PRACTICE MIDWIFE

## 2017-08-11 PROCEDURE — 99999 PR PBB SHADOW E&M-EST. PATIENT-LVL II: CPT | Mod: PBBFAC,,, | Performed by: ADVANCED PRACTICE MIDWIFE

## 2017-08-11 PROCEDURE — 90471 IMMUNIZATION ADMIN: CPT | Mod: S$GLB,,, | Performed by: ADVANCED PRACTICE MIDWIFE

## 2017-08-11 PROCEDURE — 85025 COMPLETE CBC W/AUTO DIFF WBC: CPT

## 2017-08-11 PROCEDURE — 36415 COLL VENOUS BLD VENIPUNCTURE: CPT | Mod: PO

## 2017-08-11 PROCEDURE — 86703 HIV-1/HIV-2 1 RESULT ANTBDY: CPT

## 2017-08-11 NOTE — PROGRESS NOTES
TDAP given IM right deltoid. Order per Eliz Rodriguez on 8/11/17. Advised pt to remain 15 min. After injection. No complications.

## 2017-08-11 NOTE — PROGRESS NOTES
Doing well, only complaint is tingling and pain in hands and swelling in feet and legs. Has braces for hands that help somewhat. Denies headaches, blurred vision, RUQ pain. Swelling is equal in both legs, non pitting, recommended lemon water or cucumber water. No signs preE. 28 week labs today. TDAP today.    Coffective counseling sheet Keep Baby Close discussed with mother. Reinforced rooming in practices, continued skin to skin, and quiet hours as requested by mother.  Encouraged mother to download [employer] mobile serina if she has not already done so. Mother verbalizes understanding.

## 2017-08-12 LAB — RPR SER QL: NORMAL

## 2017-08-14 LAB — HIV 1+2 AB+HIV1 P24 AG SERPL QL IA: NEGATIVE

## 2017-08-15 ENCOUNTER — PATIENT MESSAGE (OUTPATIENT)
Dept: OBSTETRICS AND GYNECOLOGY | Facility: CLINIC | Age: 32
End: 2017-08-15

## 2017-08-22 ENCOUNTER — PATIENT MESSAGE (OUTPATIENT)
Dept: OBSTETRICS AND GYNECOLOGY | Facility: CLINIC | Age: 32
End: 2017-08-22

## 2017-08-25 ENCOUNTER — ROUTINE PRENATAL (OUTPATIENT)
Dept: OBSTETRICS AND GYNECOLOGY | Facility: CLINIC | Age: 32
End: 2017-08-25
Payer: COMMERCIAL

## 2017-08-25 VITALS
BODY MASS INDEX: 32.11 KG/M2 | SYSTOLIC BLOOD PRESSURE: 116 MMHG | DIASTOLIC BLOOD PRESSURE: 78 MMHG | WEIGHT: 185.63 LBS

## 2017-08-25 DIAGNOSIS — Z34.03 ENCOUNTER FOR SUPERVISION OF NORMAL FIRST PREGNANCY IN THIRD TRIMESTER: Primary | ICD-10-CM

## 2017-08-25 PROCEDURE — 0502F SUBSEQUENT PRENATAL CARE: CPT | Mod: S$GLB,,, | Performed by: ADVANCED PRACTICE MIDWIFE

## 2017-08-25 PROCEDURE — 99999 PR PBB SHADOW E&M-EST. PATIENT-LVL III: CPT | Mod: PBBFAC,,, | Performed by: ADVANCED PRACTICE MIDWIFE

## 2017-08-25 RX ORDER — CALCIUM CARBONATE 200(500)MG
1 TABLET,CHEWABLE ORAL DAILY
COMMUNITY
End: 2021-10-29

## 2017-08-25 NOTE — PROGRESS NOTES
Doing great, denies problems. Reviewed 28 week labs, all normal. Gave childbirth and breastfeeding class info.   Plans primary C/S for Usher syndrome, breastfeeding, and condoms for birth control. They would like another baby in next 2 years. PTL discussion, fetal kick counts.    Coffective counseling sheet Keep Baby Close discussed with mother. Reinforced rooming in practices, continued skin to skin, and quiet hours as requested by mother.  Encouraged mother to download [employer] mobile serina if she has not already done so. Mother verbalizes understanding.

## 2017-08-28 ENCOUNTER — PATIENT MESSAGE (OUTPATIENT)
Dept: OBSTETRICS AND GYNECOLOGY | Facility: CLINIC | Age: 32
End: 2017-08-28

## 2017-09-08 ENCOUNTER — ROUTINE PRENATAL (OUTPATIENT)
Dept: OBSTETRICS AND GYNECOLOGY | Facility: CLINIC | Age: 32
End: 2017-09-08
Payer: COMMERCIAL

## 2017-09-08 VITALS
BODY MASS INDEX: 32.72 KG/M2 | SYSTOLIC BLOOD PRESSURE: 124 MMHG | WEIGHT: 189.13 LBS | DIASTOLIC BLOOD PRESSURE: 70 MMHG

## 2017-09-08 DIAGNOSIS — H35.52 USHER SYNDROME: ICD-10-CM

## 2017-09-08 DIAGNOSIS — H90.3 USHER SYNDROME: ICD-10-CM

## 2017-09-08 DIAGNOSIS — Z34.03 ENCOUNTER FOR SUPERVISION OF NORMAL FIRST PREGNANCY IN THIRD TRIMESTER: Primary | ICD-10-CM

## 2017-09-08 PROCEDURE — 0502F SUBSEQUENT PRENATAL CARE: CPT | Mod: S$GLB,,, | Performed by: ADVANCED PRACTICE MIDWIFE

## 2017-09-08 PROCEDURE — 99999 PR PBB SHADOW E&M-EST. PATIENT-LVL II: CPT | Mod: PBBFAC,,, | Performed by: ADVANCED PRACTICE MIDWIFE

## 2017-09-08 NOTE — PROGRESS NOTES
Doing well, denies problems. Good fetal movement, denies contractions. Still need to breastfeeding class. Fetal kick counts and PTL discussion.    Coffective counseling sheet Learn Your Baby discussed with mother. Instructed regarding feeding cues and methods to calm baby. Encouraged mother to download [employer] mobile serina if she has not already done so.  Mother verbalized understanding.

## 2017-09-18 ENCOUNTER — PATIENT MESSAGE (OUTPATIENT)
Dept: OBSTETRICS AND GYNECOLOGY | Facility: CLINIC | Age: 32
End: 2017-09-18

## 2017-09-21 ENCOUNTER — ROUTINE PRENATAL (OUTPATIENT)
Dept: OBSTETRICS AND GYNECOLOGY | Facility: CLINIC | Age: 32
End: 2017-09-21
Payer: COMMERCIAL

## 2017-09-21 VITALS — SYSTOLIC BLOOD PRESSURE: 116 MMHG | DIASTOLIC BLOOD PRESSURE: 70 MMHG | WEIGHT: 189 LBS | BODY MASS INDEX: 32.7 KG/M2

## 2017-09-21 DIAGNOSIS — Z34.03 ENCOUNTER FOR SUPERVISION OF NORMAL FIRST PREGNANCY IN THIRD TRIMESTER: Primary | ICD-10-CM

## 2017-09-21 PROCEDURE — 0502F SUBSEQUENT PRENATAL CARE: CPT | Mod: S$GLB,,, | Performed by: ADVANCED PRACTICE MIDWIFE

## 2017-09-21 PROCEDURE — 99999 PR PBB SHADOW E&M-EST. PATIENT-LVL II: CPT | Mod: PBBFAC,,, | Performed by: ADVANCED PRACTICE MIDWIFE

## 2017-09-21 NOTE — PROGRESS NOTES
Doing well, good fetal movement. No  labor.   Several questions answered. Unable to take breastfeeding classes because of work schedule. Will help while she is at hospital.   Breast pump rx sent to insurance.     Coffective counseling sheet Nourish discussed with mother. Reinforced basic breastfeeding position and latch as well as proper hand expression technique. Encouraged mother to download [employer] mobile serina if she has not already done so.  Mother verbalizes understanding.

## 2017-10-05 ENCOUNTER — ROUTINE PRENATAL (OUTPATIENT)
Dept: OBSTETRICS AND GYNECOLOGY | Facility: CLINIC | Age: 32
End: 2017-10-05
Payer: COMMERCIAL

## 2017-10-05 VITALS
WEIGHT: 190.94 LBS | DIASTOLIC BLOOD PRESSURE: 64 MMHG | BODY MASS INDEX: 33.03 KG/M2 | SYSTOLIC BLOOD PRESSURE: 124 MMHG

## 2017-10-05 DIAGNOSIS — Z34.03 ENCOUNTER FOR SUPERVISION OF NORMAL FIRST PREGNANCY IN THIRD TRIMESTER: Primary | ICD-10-CM

## 2017-10-05 PROCEDURE — 0502F SUBSEQUENT PRENATAL CARE: CPT | Mod: S$GLB,,, | Performed by: ADVANCED PRACTICE MIDWIFE

## 2017-10-05 PROCEDURE — 99999 PR PBB SHADOW E&M-EST. PATIENT-LVL II: CPT | Mod: PBBFAC,,, | Performed by: ADVANCED PRACTICE MIDWIFE

## 2017-10-05 NOTE — PROGRESS NOTES
Doing well, denies problems. No BH contractions.   GBS next visit, handout given.   Questions about date of C/S, deferred to Dr. Jcarlos Almaguer by Leopold's EFW 6 lb

## 2017-10-20 ENCOUNTER — ROUTINE PRENATAL (OUTPATIENT)
Dept: OBSTETRICS AND GYNECOLOGY | Facility: CLINIC | Age: 32
End: 2017-10-20
Payer: COMMERCIAL

## 2017-10-20 VITALS
WEIGHT: 193.13 LBS | SYSTOLIC BLOOD PRESSURE: 118 MMHG | BODY MASS INDEX: 33.41 KG/M2 | DIASTOLIC BLOOD PRESSURE: 80 MMHG

## 2017-10-20 DIAGNOSIS — H35.52 USHER SYNDROME: ICD-10-CM

## 2017-10-20 DIAGNOSIS — Z34.93 NORMAL PREGNANCY IN THIRD TRIMESTER: Primary | ICD-10-CM

## 2017-10-20 DIAGNOSIS — H90.3 USHER SYNDROME: ICD-10-CM

## 2017-10-20 PROCEDURE — 99999 PR PBB SHADOW E&M-EST. PATIENT-LVL I: CPT | Mod: PBBFAC,,, | Performed by: OBSTETRICS & GYNECOLOGY

## 2017-10-20 PROCEDURE — 87081 CULTURE SCREEN ONLY: CPT

## 2017-10-20 PROCEDURE — 0502F SUBSEQUENT PRENATAL CARE: CPT | Mod: S$GLB,,, | Performed by: OBSTETRICS & GYNECOLOGY

## 2017-10-20 NOTE — PROGRESS NOTES
Pt doing well.  Reports no complaints.  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   present during entire visit.  Surgery details, indications, risks, benefits, and alternatives were discussed.  Primary C/S scheduled on 11/14/17 @ 0700.  GBS done.  CVX: LTC  Labor precautions and kick counts.

## 2017-10-23 LAB — BACTERIA SPEC AEROBE CULT: NORMAL

## 2017-10-27 ENCOUNTER — ROUTINE PRENATAL (OUTPATIENT)
Dept: OBSTETRICS AND GYNECOLOGY | Facility: CLINIC | Age: 32
End: 2017-10-27
Payer: COMMERCIAL

## 2017-10-27 VITALS — SYSTOLIC BLOOD PRESSURE: 122 MMHG | BODY MASS INDEX: 33.56 KG/M2 | DIASTOLIC BLOOD PRESSURE: 68 MMHG | WEIGHT: 194 LBS

## 2017-10-27 DIAGNOSIS — Z34.03 ENCOUNTER FOR SUPERVISION OF NORMAL FIRST PREGNANCY IN THIRD TRIMESTER: Primary | ICD-10-CM

## 2017-10-27 PROCEDURE — 0502F SUBSEQUENT PRENATAL CARE: CPT | Mod: S$GLB,,, | Performed by: ADVANCED PRACTICE MIDWIFE

## 2017-10-27 PROCEDURE — 99999 PR PBB SHADOW E&M-EST. PATIENT-LVL II: CPT | Mod: PBBFAC,,, | Performed by: ADVANCED PRACTICE MIDWIFE

## 2017-10-27 NOTE — PROGRESS NOTES
Doing well, normal T3 complaints of swelling and vaginal pressure/pain. No cervical change.   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 Reassured that cervix is not opening and should be fine for C/S on 11/14.  Fetal kick count, labor talk, when to go to the hospital.     Coffective counseling sheet Protect Breastfeeding discussed with mother. Reinforced avoidence of artifical nipples and formula, unless medically indicated.  Encouraged mother to download [employer] mobile serina if she has not already done so. Mother verbalizes understanding.

## 2017-11-02 ENCOUNTER — ROUTINE PRENATAL (OUTPATIENT)
Dept: OBSTETRICS AND GYNECOLOGY | Facility: CLINIC | Age: 32
End: 2017-11-02
Payer: COMMERCIAL

## 2017-11-02 VITALS
DIASTOLIC BLOOD PRESSURE: 64 MMHG | BODY MASS INDEX: 33.79 KG/M2 | SYSTOLIC BLOOD PRESSURE: 102 MMHG | WEIGHT: 195.31 LBS

## 2017-11-02 DIAGNOSIS — Z34.93 NORMAL PREGNANCY IN THIRD TRIMESTER: Primary | ICD-10-CM

## 2017-11-02 DIAGNOSIS — H35.52 USHER SYNDROME: ICD-10-CM

## 2017-11-02 DIAGNOSIS — H90.3 USHER SYNDROME: ICD-10-CM

## 2017-11-02 PROCEDURE — 99999 PR PBB SHADOW E&M-EST. PATIENT-LVL II: CPT | Mod: PBBFAC,,, | Performed by: OBSTETRICS & GYNECOLOGY

## 2017-11-02 PROCEDURE — 0502F SUBSEQUENT PRENATAL CARE: CPT | Mod: S$GLB,,, | Performed by: OBSTETRICS & GYNECOLOGY

## 2017-11-02 NOTE — PROGRESS NOTES
Pt reports complaints of pressure, but otherwise doing well.  CVX: Closed/30/-3, cephalic  Labor precautions and kick counts.

## 2017-11-07 ENCOUNTER — ROUTINE PRENATAL (OUTPATIENT)
Dept: OBSTETRICS AND GYNECOLOGY | Facility: CLINIC | Age: 32
End: 2017-11-07
Payer: COMMERCIAL

## 2017-11-07 VITALS — WEIGHT: 194 LBS | DIASTOLIC BLOOD PRESSURE: 83 MMHG | BODY MASS INDEX: 33.56 KG/M2 | SYSTOLIC BLOOD PRESSURE: 127 MMHG

## 2017-11-07 DIAGNOSIS — Z34.03 ENCOUNTER FOR SUPERVISION OF NORMAL FIRST PREGNANCY IN THIRD TRIMESTER: Primary | ICD-10-CM

## 2017-11-07 PROCEDURE — 99999 PR PBB SHADOW E&M-EST. PATIENT-LVL II: CPT | Mod: PBBFAC,,, | Performed by: ADVANCED PRACTICE MIDWIFE

## 2017-11-07 PROCEDURE — 0502F SUBSEQUENT PRENATAL CARE: CPT | Mod: S$GLB,,, | Performed by: ADVANCED PRACTICE MIDWIFE

## 2017-11-07 NOTE — PROGRESS NOTES
Doing well, ready for baby. Good fetal movement.   Reviewed NPO after midnight, gave hibicleanse and instructed to use night before and morning of surgery.   Labor talk, fetal kick counts.

## 2017-11-13 NOTE — PRE ADMISSION SCREENING
Pre op instructions reviewed with patient per phone:  # [phone]    To confirm, you surgeon has scheduled you for a .  Surgery is scheduled 17 .  Your doctor will instruct on the time of  your surgery.    Please report to [facility] 4th floor at time instructed by your doctor.  Arrive 2 hours prior to surgery        IMPORTANT INSTRUCTIONS!!  Do not eat anything 8 hours prior to surgery.    You may have water and clear juice 3 hours prior to surgery.    OK to brush teeth, no gum, candy or mints!    So not shave pubic hair 7 days prior to surgery      SHOWERING INSTRUCTIONS:   The night before and morning prior to coming to the hospital:    In the shower, it is best practice to wash and rinse your hair with shampoo, rinse completely     Wet entire body and wash with anti-bacterial soap, rinse completely    With your hand, apply one packet of Hibiclens soap to abdomen from under breasts to above pubic bone, gently scrubbing for 5 minutes.  Do not scrub your skin too hard  Avoid getting Hibiclens on your head, face, or genitals (private parts)    Once you have completed the wash, rinse the Hibiclens thoroughly.      Do not wash with regular soap or anti-bacterial soap after using the Hibiclens    Pat yourself dry using clean dry towel.  DO NOT apply any lotions or powder to abdomen.    Put on clean clothes    It is also recommended best practice to remove all artificial nails/polish prior to surgery

## 2017-11-14 ENCOUNTER — ANESTHESIA (OUTPATIENT)
Dept: OBSTETRICS AND GYNECOLOGY | Facility: HOSPITAL | Age: 32
End: 2017-11-14
Payer: COMMERCIAL

## 2017-11-14 ENCOUNTER — HOSPITAL ENCOUNTER (INPATIENT)
Facility: HOSPITAL | Age: 32
LOS: 2 days | Discharge: HOME | End: 2017-11-16
Attending: OBSTETRICS & GYNECOLOGY | Admitting: OBSTETRICS & GYNECOLOGY
Payer: COMMERCIAL

## 2017-11-14 ENCOUNTER — SURGERY (OUTPATIENT)
Age: 32
End: 2017-11-14

## 2017-11-14 ENCOUNTER — ANESTHESIA EVENT (OUTPATIENT)
Dept: OBSTETRICS AND GYNECOLOGY | Facility: HOSPITAL | Age: 32
End: 2017-11-14
Payer: COMMERCIAL

## 2017-11-14 DIAGNOSIS — Z98.891 STATUS POST PRIMARY LOW TRANSVERSE CESAREAN SECTION: Primary | ICD-10-CM

## 2017-11-14 DIAGNOSIS — Z34.93 NORMAL PREGNANCY IN THIRD TRIMESTER: ICD-10-CM

## 2017-11-14 LAB
ABO + RH BLD: NORMAL
BASOPHILS # BLD AUTO: 0.02 K/UL
BASOPHILS NFR BLD: 0.2 %
BLD GP AB SCN CELLS X3 SERPL QL: NORMAL
DIFFERENTIAL METHOD: ABNORMAL
EOSINOPHIL # BLD AUTO: 0 K/UL
EOSINOPHIL NFR BLD: 0.5 %
ERYTHROCYTE [DISTWIDTH] IN BLOOD BY AUTOMATED COUNT: 13.3 %
HCT VFR BLD AUTO: 36.3 %
HGB BLD-MCNC: 12.1 G/DL
LYMPHOCYTES # BLD AUTO: 1.7 K/UL
LYMPHOCYTES NFR BLD: 21.4 %
MCH RBC QN AUTO: 30.3 PG
MCHC RBC AUTO-ENTMCNC: 33.3 G/DL
MCV RBC AUTO: 91 FL
MONOCYTES # BLD AUTO: 0.7 K/UL
MONOCYTES NFR BLD: 8.4 %
NEUTROPHILS # BLD AUTO: 5.7 K/UL
NEUTROPHILS NFR BLD: 69.5 %
PLATELET # BLD AUTO: 220 K/UL
PMV BLD AUTO: 10.9 FL
RBC # BLD AUTO: 4 M/UL
WBC # BLD AUTO: 8.14 K/UL

## 2017-11-14 PROCEDURE — 36000685 HC CESAREAN SECTION LEVEL I

## 2017-11-14 PROCEDURE — 63600175 PHARM REV CODE 636 W HCPCS: Performed by: OBSTETRICS & GYNECOLOGY

## 2017-11-14 PROCEDURE — 25000003 PHARM REV CODE 250: Performed by: NURSE ANESTHETIST, CERTIFIED REGISTERED

## 2017-11-14 PROCEDURE — 63600175 PHARM REV CODE 636 W HCPCS: Performed by: NURSE ANESTHETIST, CERTIFIED REGISTERED

## 2017-11-14 PROCEDURE — 25000003 PHARM REV CODE 250: Performed by: OBSTETRICS & GYNECOLOGY

## 2017-11-14 PROCEDURE — 11000001 HC ACUTE MED/SURG PRIVATE ROOM

## 2017-11-14 PROCEDURE — 59514 CESAREAN DELIVERY ONLY: CPT | Mod: AS,GB,, | Performed by: PHYSICIAN ASSISTANT

## 2017-11-14 PROCEDURE — 85025 COMPLETE CBC W/AUTO DIFF WBC: CPT

## 2017-11-14 PROCEDURE — 37000008 HC ANESTHESIA 1ST 15 MINUTES: Performed by: OBSTETRICS & GYNECOLOGY

## 2017-11-14 PROCEDURE — 86900 BLOOD TYPING SEROLOGIC ABO: CPT

## 2017-11-14 PROCEDURE — 27200688 HC TRAY, SPINAL-HYPER/ ISOBARIC: Performed by: NURSE ANESTHETIST, CERTIFIED REGISTERED

## 2017-11-14 PROCEDURE — 86901 BLOOD TYPING SEROLOGIC RH(D): CPT

## 2017-11-14 PROCEDURE — 51702 INSERT TEMP BLADDER CATH: CPT

## 2017-11-14 PROCEDURE — 37000009 HC ANESTHESIA EA ADD 15 MINS: Performed by: OBSTETRICS & GYNECOLOGY

## 2017-11-14 PROCEDURE — 59510 CESAREAN DELIVERY: CPT | Mod: GB,,, | Performed by: OBSTETRICS & GYNECOLOGY

## 2017-11-14 RX ORDER — IBUPROFEN 800 MG/1
800 TABLET ORAL EVERY 8 HOURS
Status: DISCONTINUED | OUTPATIENT
Start: 2017-11-15 | End: 2017-11-16 | Stop reason: HOSPADM

## 2017-11-14 RX ORDER — HYDROCODONE BITARTRATE AND ACETAMINOPHEN 10; 325 MG/1; MG/1
1 TABLET ORAL EVERY 4 HOURS PRN
Status: DISCONTINUED | OUTPATIENT
Start: 2017-11-14 | End: 2017-11-16 | Stop reason: HOSPADM

## 2017-11-14 RX ORDER — OXYTOCIN/RINGER'S LACTATE 20/1000 ML
41.65 PLASTIC BAG, INJECTION (ML) INTRAVENOUS CONTINUOUS
Status: DISPENSED | OUTPATIENT
Start: 2017-11-14 | End: 2017-11-15

## 2017-11-14 RX ORDER — HYDROCODONE BITARTRATE AND ACETAMINOPHEN 5; 325 MG/1; MG/1
1 TABLET ORAL EVERY 4 HOURS PRN
Status: DISCONTINUED | OUTPATIENT
Start: 2017-11-14 | End: 2017-11-16 | Stop reason: HOSPADM

## 2017-11-14 RX ORDER — KETOROLAC TROMETHAMINE 30 MG/ML
30 INJECTION, SOLUTION INTRAMUSCULAR; INTRAVENOUS EVERY 6 HOURS
Status: DISPENSED | OUTPATIENT
Start: 2017-11-14 | End: 2017-11-15

## 2017-11-14 RX ORDER — HYDROCORTISONE 25 MG/G
CREAM TOPICAL 3 TIMES DAILY PRN
Status: DISCONTINUED | OUTPATIENT
Start: 2017-11-14 | End: 2017-11-16 | Stop reason: HOSPADM

## 2017-11-14 RX ORDER — OXYTOCIN 10 [USP'U]/ML
INJECTION, SOLUTION INTRAMUSCULAR; INTRAVENOUS
Status: DISCONTINUED | OUTPATIENT
Start: 2017-11-14 | End: 2017-11-14

## 2017-11-14 RX ORDER — ONDANSETRON 8 MG/1
8 TABLET, ORALLY DISINTEGRATING ORAL EVERY 8 HOURS PRN
Status: DISCONTINUED | OUTPATIENT
Start: 2017-11-14 | End: 2017-11-16 | Stop reason: HOSPADM

## 2017-11-14 RX ORDER — SODIUM CHLORIDE, SODIUM LACTATE, POTASSIUM CHLORIDE, CALCIUM CHLORIDE 600; 310; 30; 20 MG/100ML; MG/100ML; MG/100ML; MG/100ML
INJECTION, SOLUTION INTRAVENOUS CONTINUOUS PRN
Status: DISCONTINUED | OUTPATIENT
Start: 2017-11-14 | End: 2017-11-14

## 2017-11-14 RX ORDER — SIMETHICONE 80 MG
1 TABLET,CHEWABLE ORAL EVERY 6 HOURS PRN
Status: DISCONTINUED | OUTPATIENT
Start: 2017-11-14 | End: 2017-11-16 | Stop reason: HOSPADM

## 2017-11-14 RX ORDER — CEFAZOLIN SODIUM 2 G/50ML
2 SOLUTION INTRAVENOUS
Status: COMPLETED | OUTPATIENT
Start: 2017-11-14 | End: 2017-11-14

## 2017-11-14 RX ORDER — PHENYLEPHRINE HYDROCHLORIDE 10 MG/ML
INJECTION INTRAVENOUS
Status: DISCONTINUED | OUTPATIENT
Start: 2017-11-14 | End: 2017-11-14

## 2017-11-14 RX ORDER — AMOXICILLIN 250 MG
1 CAPSULE ORAL NIGHTLY PRN
Status: DISCONTINUED | OUTPATIENT
Start: 2017-11-14 | End: 2017-11-16 | Stop reason: HOSPADM

## 2017-11-14 RX ORDER — SODIUM CHLORIDE, SODIUM LACTATE, POTASSIUM CHLORIDE, CALCIUM CHLORIDE 600; 310; 30; 20 MG/100ML; MG/100ML; MG/100ML; MG/100ML
INJECTION, SOLUTION INTRAVENOUS CONTINUOUS
Status: DISCONTINUED | OUTPATIENT
Start: 2017-11-14 | End: 2017-11-14

## 2017-11-14 RX ORDER — ACETAMINOPHEN 325 MG/1
650 TABLET ORAL EVERY 6 HOURS PRN
Status: DISCONTINUED | OUTPATIENT
Start: 2017-11-14 | End: 2017-11-16 | Stop reason: HOSPADM

## 2017-11-14 RX ORDER — SODIUM CITRATE AND CITRIC ACID MONOHYDRATE 334; 500 MG/5ML; MG/5ML
30 SOLUTION ORAL
Status: DISCONTINUED | OUTPATIENT
Start: 2017-11-14 | End: 2017-11-14

## 2017-11-14 RX ORDER — MISOPROSTOL 200 UG/1
200 TABLET ORAL ONCE AS NEEDED
Status: ACTIVE | OUTPATIENT
Start: 2017-11-14 | End: 2017-11-14

## 2017-11-14 RX ORDER — DIPHENHYDRAMINE HCL 25 MG
25 CAPSULE ORAL EVERY 4 HOURS PRN
Status: DISCONTINUED | OUTPATIENT
Start: 2017-11-14 | End: 2017-11-16 | Stop reason: HOSPADM

## 2017-11-14 RX ORDER — BISACODYL 10 MG
10 SUPPOSITORY, RECTAL RECTAL ONCE AS NEEDED
Status: ACTIVE | OUTPATIENT
Start: 2017-11-14 | End: 2017-11-14

## 2017-11-14 RX ORDER — AMMONIA 15 % (W/V)
0.3 AMPUL (EA) INHALATION CONTINUOUS PRN
Status: DISCONTINUED | OUTPATIENT
Start: 2017-11-14 | End: 2017-11-16 | Stop reason: HOSPADM

## 2017-11-14 RX ORDER — PRENATAL WITH FERROUS FUM AND FOLIC ACID 3080; 920; 120; 400; 22; 1.84; 3; 20; 10; 1; 12; 200; 27; 25; 2 [IU]/1; [IU]/1; MG/1; [IU]/1; MG/1; MG/1; MG/1; MG/1; MG/1; MG/1; UG/1; MG/1; MG/1; MG/1; MG/1
1 TABLET ORAL DAILY
Status: DISCONTINUED | OUTPATIENT
Start: 2017-11-15 | End: 2017-11-15

## 2017-11-14 RX ORDER — DIPHENHYDRAMINE HYDROCHLORIDE 50 MG/ML
25 INJECTION INTRAMUSCULAR; INTRAVENOUS EVERY 4 HOURS PRN
Status: DISCONTINUED | OUTPATIENT
Start: 2017-11-14 | End: 2017-11-16 | Stop reason: HOSPADM

## 2017-11-14 RX ORDER — MISOPROSTOL 200 UG/1
800 TABLET ORAL
Status: DISCONTINUED | OUTPATIENT
Start: 2017-11-14 | End: 2017-11-14

## 2017-11-14 RX ORDER — MORPHINE SULFATE 1 MG/ML
INJECTION, SOLUTION EPIDURAL; INTRATHECAL; INTRAVENOUS
Status: DISCONTINUED | OUTPATIENT
Start: 2017-11-14 | End: 2017-11-14

## 2017-11-14 RX ORDER — DOCUSATE SODIUM 100 MG/1
100 CAPSULE, LIQUID FILLED ORAL DAILY
Status: DISCONTINUED | OUTPATIENT
Start: 2017-11-15 | End: 2017-11-16 | Stop reason: HOSPADM

## 2017-11-14 RX ORDER — ONDANSETRON 2 MG/ML
INJECTION INTRAMUSCULAR; INTRAVENOUS
Status: DISCONTINUED | OUTPATIENT
Start: 2017-11-14 | End: 2017-11-14

## 2017-11-14 RX ORDER — HYDROMORPHONE HYDROCHLORIDE 1 MG/ML
1 INJECTION, SOLUTION INTRAMUSCULAR; INTRAVENOUS; SUBCUTANEOUS EVERY 4 HOURS PRN
Status: DISCONTINUED | OUTPATIENT
Start: 2017-11-14 | End: 2017-11-16 | Stop reason: HOSPADM

## 2017-11-14 RX ADMIN — MORPHINE SULFATE 200 MCG: 1 INJECTION, SOLUTION EPIDURAL; INTRATHECAL; INTRAVENOUS at 04:11

## 2017-11-14 RX ADMIN — SODIUM CHLORIDE, SODIUM LACTATE, POTASSIUM CHLORIDE, AND CALCIUM CHLORIDE: 600; 310; 30; 20 INJECTION, SOLUTION INTRAVENOUS at 04:11

## 2017-11-14 RX ADMIN — PHENYLEPHRINE HYDROCHLORIDE 100 MCG: 10 INJECTION INTRAVENOUS at 04:11

## 2017-11-14 RX ADMIN — OXYTOCIN 20 UNITS: 10 INJECTION, SOLUTION INTRAMUSCULAR; INTRAVENOUS at 04:11

## 2017-11-14 RX ADMIN — PROMETHAZINE HYDROCHLORIDE 12.5 MG: 25 INJECTION INTRAMUSCULAR; INTRAVENOUS at 08:11

## 2017-11-14 RX ADMIN — PHENYLEPHRINE HYDROCHLORIDE 50 MCG: 10 INJECTION INTRAVENOUS at 04:11

## 2017-11-14 RX ADMIN — Medication 41.65 MILLI-UNITS/MIN: at 08:11

## 2017-11-14 RX ADMIN — ONDANSETRON 4 MG: 2 INJECTION, SOLUTION INTRAMUSCULAR; INTRAVENOUS at 03:11

## 2017-11-14 RX ADMIN — CEFAZOLIN SODIUM 2 G: 2 SOLUTION INTRAVENOUS at 04:11

## 2017-11-14 RX ADMIN — SODIUM CHLORIDE, SODIUM LACTATE, POTASSIUM CHLORIDE, AND CALCIUM CHLORIDE 2000 ML: 600; 310; 30; 20 INJECTION, SOLUTION INTRAVENOUS at 03:11

## 2017-11-14 NOTE — SUBJECTIVE & OBJECTIVE
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    DOCOSAHEXANOIC ACID (DHA ALGAL-900 ORAL) Take by mouth.    folic acid (FOLVITE) 800 MCG Tab Take 800 mcg by mouth once daily.    meclizine (ANTIVERT) 25 mg tablet Take 1 tablet (25 mg total) by mouth 3 (three) times daily as needed.    scopolamine (TRANSDERM-SCOP) 1.5 mg (1 mg over 3 days) Place 1 patch (1.5 mg total) onto the skin every 72 hours.    vitamin A 8000 UNIT capsule Take 8,000 Units by mouth once daily.       Review of patient's allergies indicates:  No Known Allergies     Family History     Problem Relation (Age of Onset)    Alzheimer's disease Maternal Grandmother    Cancer Maternal Aunt, Maternal Grandfather, Paternal Grandfather    Cirrhosis Maternal Grandfather    Diabetes Maternal Grandfather, Paternal Grandmother    Heart attacks under age 50 Father    Heart disease Paternal Grandmother    Hypertension Father, Maternal Grandmother, Paternal Grandmother    No Known Problems Sister, Brother        Social History Main Topics    Smoking status: Former Smoker     Packs/day: 0.25     Types: Cigarettes     Quit date: 3/2/2017    Smokeless tobacco: Never Used    Alcohol use 0.0 oz/week      Comment: socially     Drug use: No    Sexual activity: Yes     Partners: Male     Birth control/  protection: None     Review of Systems   Constitutional: Positive for fatigue. Negative for activity change, appetite change, fever and unexpected weight change.   Respiratory: Negative for shortness of breath.    Cardiovascular: Negative for chest pain, palpitations and leg swelling.   Gastrointestinal: Positive for abdominal pain. Negative for constipation, diarrhea, nausea and vomiting.   Genitourinary: Negative for dysuria, flank pain, frequency, genital sores, hematuria, pelvic pain, vaginal bleeding, vaginal discharge, vaginal pain, urinary incontinence and vaginal odor.   Musculoskeletal: Positive for back pain.   Neurological: Negative for syncope and headaches.      Objective:     Vital Signs (Most Recent):    Vital Signs (24h Range):           There is no height or weight on file to calculate BMI.    FHT: Cat 1 (reassuring)  TOCO:     Physical Exam:   Constitutional: She is oriented to person, place, and time. She appears well-developed and well-nourished. No distress.    HENT:   Head: Normocephalic and atraumatic.    Eyes: EOM are normal. Pupils are equal, round, and reactive to light.    Neck: Normal range of motion. Neck supple.    Cardiovascular: Normal rate, regular rhythm and normal heart sounds.     Pulmonary/Chest: Effort normal and breath sounds normal.        Abdominal: Soft. Bowel sounds are normal. She exhibits no distension. There is no tenderness.             Musculoskeletal: Normal range of motion and moves all extremeties. She exhibits no edema or tenderness.       Neurological: She is alert and oriented to person, place, and time.    Skin: Skin is warm and dry.    Psychiatric: She has a normal mood and affect. Her behavior is normal. Thought content normal.       Significant Labs:  Lab Results   Component Value Date    GROUPTRH A POS 03/16/2017    HEPBSAG Negative 03/16/2017    STREPBCULT No Group B Streptococcus isolated 10/20/2017       I have personallly reviewed all pertinent lab  results from the last 24 hours.

## 2017-11-14 NOTE — H&P
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  Pt report no complaints.  Is ready for surgery.    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    DOCOSAHEXANOIC ACID (DHA ALGAL-900 ORAL) Take by mouth.    folic acid (FOLVITE) 800 MCG Tab Take 800 mcg by mouth once daily.    meclizine (ANTIVERT) 25 mg tablet Take 1 tablet (25 mg total) by mouth 3 (three) times daily as needed.    scopolamine (TRANSDERM-SCOP) 1.5 mg (1 mg over 3 days) Place 1 patch (1.5 mg total) onto the skin every 72 hours.    vitamin A 8000 UNIT capsule Take 8,000 Units by mouth once daily.       Review of patient's allergies indicates:  No Known Allergies     Family History     Problem Relation (Age of Onset)    Alzheimer's disease Maternal Grandmother    Cancer Maternal Aunt, Maternal Grandfather, Paternal Grandfather    Cirrhosis Maternal Grandfather    Diabetes Maternal Grandfather, Paternal Grandmother    Heart attacks under age 50 Father    Heart disease Paternal Grandmother     Hypertension Father, Maternal Grandmother, Paternal Grandmother    No Known Problems Sister, Brother        Social History Main Topics    Smoking status: Former Smoker     Packs/day: 0.25     Types: Cigarettes     Quit date: 3/2/2017    Smokeless tobacco: Never Used    Alcohol use 0.0 oz/week      Comment: socially     Drug use: No    Sexual activity: Yes     Partners: Male     Birth control/ protection: None     Review of Systems   Constitutional: Positive for fatigue. Negative for activity change, appetite change, fever and unexpected weight change.   Respiratory: Negative for shortness of breath.    Cardiovascular: Negative for chest pain, palpitations and leg swelling.   Gastrointestinal: Positive for abdominal pain. Negative for constipation, diarrhea, nausea and vomiting.   Genitourinary: Negative for dysuria, flank pain, frequency, genital sores, hematuria, pelvic pain, vaginal bleeding, vaginal discharge, vaginal pain, urinary incontinence and vaginal odor.   Musculoskeletal: Positive for back pain.   Neurological: Negative for syncope and headaches.      Objective:     Vital Signs (Most Recent):    Vital Signs (24h Range):           There is no height or weight on file to calculate BMI.    FHT: Cat 1 (reassuring)  TOCO:     Physical Exam:   Constitutional: She is oriented to person, place, and time. She appears well-developed and well-nourished. No distress.    HENT:   Head: Normocephalic and atraumatic.    Eyes: EOM are normal. Pupils are equal, round, and reactive to light.    Neck: Normal range of motion. Neck supple.    Cardiovascular: Normal rate, regular rhythm and normal heart sounds.     Pulmonary/Chest: Effort normal and breath sounds normal.        Abdominal: Soft. Bowel sounds are normal. She exhibits no distension. There is no tenderness.             Musculoskeletal: Normal range of motion and moves all extremeties. She exhibits no edema or tenderness.       Neurological: She is alert and  oriented to person, place, and time.    Skin: Skin is warm and dry.    Psychiatric: She has a normal mood and affect. Her behavior is normal. Thought content normal.       Significant Labs:  Lab Results   Component Value Date    GROUPTRH A POS 2017    HEPBSAG Negative 2017    STREPBCULT No Group B Streptococcus isolated 10/20/2017       I have personallly reviewed all pertinent lab results from the last 24 hours.    Assessment/Plan:     31 y.o. female  at 39w6d for:    Normal pregnancy in third trimester    Pt here for scheduled C/S.  Hospital consents reviewed with pt and signed.  OR team aware.            Silvestre Garber MD  Obstetrics  [facility] -

## 2017-11-14 NOTE — OP NOTE
OPERATIVE NOTE    DATE:  2017    PRE-PROCEDURE COUNSELING:  Patient counseled on the risks, benefits, and alternatives to procedure.  Please see preoperative consents.   SCDs were applied and working prior to anesthesia induction.    PRE-OPERATIVE DIAGNOSIS:    1.  IUP at 39w6d  2.  Usher Syndrome  3.  Desires primary     POST-OPERATIVE DIAGNOSIS: same    PROCEDURE: Primary Low Transverse  Section    SURGEON: Silvestre Garber MD    ASSISTANT: BABITA Mcgarry    ANESTHESIA: Spinal Anesthesia    FINDINGS:  Single live female infant in cephalic presentation.  APGAR 9/9 Weight 8 lb 8 oz.  Normal uterus, tubes, ovaries.    SPECIMEN:  None    EBL:  500 mL    COMPLICATIONS:  None    IMPLANTS:  None    PROCEDURE IN DETAIL:   The patient was seen in the Holding Room. The risks, benefits and alternatives were discussed. The patient agrees with the proposed plan, giving informed consent.  The patient was taken to Operating Room, identified as Nicki Larson and the procedure verified as  Delivery. A Time Out was held and the above information confirmed.    Spinal anesthesia was dosed, adequate level confirmed, and preoperative antibiotics administered.  The patient was draped and prepped in the usual sterile fashion.  A Pfannenstiel skin incision was made and carried down through the subcutaneous tissue to the fascia. Fascial incision was then made and extended transversely. The fascia was  from the underlying rectus muscles superiorly and inferiorly. The peritoneum was identified, tented up, and entered sharply.  This incision was then extended superiorly and inferiorly with good visualization of the underlying bowel and bladder.   The utero-vesical peritoneal reflection was incised transversely and the bladder flap was bluntly dissected from the lower uterine segment.  A low transverse uterine incision was made in the midline and extended laterally.  There was clear  amniotic fluid noted. The female infant was delivered from vertex presentation without difficulty and with Apgar scores of 9/9.  The umbilical cord was doubly clamped and cut.  Cord blood was obtained. The placenta was removed intact and appeared normal.  The uterine incision was then approximated in a running locked fashion with 0-Chromic.   The abdomen and pelvis were irrigated and hemostasis noted.  The rectus muscles were then loosely approximated at the midline with 2-0 Chromic.  The fascia was then approximated in a running fashion with 0-Vicryl.  Wound was irrigated and hemostasis noted.  The skin was reapproximated with 4-0 vicryl in a running subcuticular fashion and an abdominal silver dressing placed.  Instrument, sponge, and needle counts were correct prior the abdominal closure and at the conclusion of the case.     DISPOSITION: to recovery PP room           CONDITION: stable

## 2017-11-14 NOTE — HPI
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  Pt report no complaints.  Is ready for surgery.

## 2017-11-14 NOTE — L&D DELIVERY NOTE
Delivery Information for  Awilda Larson    Birth information:  YOB: 2018   Time of birth: 4:22 PM   Sex: female   Head Delivery Date/Time: 2017  4:26 PM   Delivery type: , Low Transverse   Gestational Age: 39w6d    Delivery Providers    Delivering clinician:  Silvestre Garber MD   Other personnel:   Provider Role   Elham Blank RN Registered Nurse   Mala Mcdonald RN Registered Nurse   Marilin Lerma, CRNA Nurse Anesthetist   Clementina Guerra PAALCIDES Physician Assistant   Pierre Carpenter MD Anesthesiologist               Bitely Measurements    Weight:  3860 g            Assessment    Living status:  Living  Apgars:     1 Minute:   5 Minute:   10 Minute:   15 Minute:   20 Minute:     Skin Color:   1  1       Heart Rate:   2  2       Reflex Irritability:   2  2       Muscle Tone:   2  2       Respiratory Effort:   2  2       Total:   9  9               Apgars Assigned By:  ALEXANDRIA MCDONALD RN         Assisted Delivery Details:    Forceps attempted?:  No  Vacuum extractor attempted?:  No         Shoulder Dystocia    Shoulder dystocia present?:  No           Presentation and Position    Presentation:   Vertex   Position:   Left    Occiput    Anterior            Interventions/Resuscitation    Method:  Bulb Suctioning, Tactile Stimulation       Cord    Vessels:  3 vessels  Complications:  None  Delayed Cord Clamping?:  Yes  Cord Clamped Date/Time:  2017  4:24 PM  Cord Blood Disposition:  Lab  Gases Sent?:  No  Stem Cell Collection (by MD):  No       Placenta    Date and time:  2017  4:25 PM  Removal:  Manual removal  Appearance:  Intact  Placenta disposition:  discarded           Labor Events:       labor: No     Labor Onset Date/Time:         Dilation Complete Date/Time:         Start Pushing Date/Time:       Rupture Date/Time:              Rupture type:           Fluid Amount:        Fluid Color:        Fluid Odor:        Membrane Status (PeriCalm):         Rupture Date/Time (PeriCalm):        Fluid Amount (PeriCalm):        Fluid Color (PeriCalm):         steroids: None     Antibiotics given for GBS: No     Induction: none     Indications for induction:        Augmentation:       Indications for augmentation:       Labor complications: None     Additional complications:          Cervical ripening:                     Delivery:      Episiotomy: None     Indication for Episiotomy:       Perineal Lacerations: None Repaired:      Periurethral Laceration: none Repaired:     Labial Laceration: none Repaired:     Sulcus Laceration: none Repaired:     Vaginal Laceration: No Repaired:     Cervical Laceration: No Repaired:     Repair suture:       Repair # of packets:       Vaginal delivery QBL (mL): 0      QBL (mL): 500     Combined Blood Loss (mL): 500     Vaginal Sweep Performed: No     Surgicount Correct: No       Other providers:       Anesthesia    Method:  Spinal          Details (if applicable):  Trial of Labor No    Categorization: Primary    Priority: Routine   Indications for :     Incision Type: low transverse     Additional  information:  Forceps:    Vacuum:    Breech:    Observed anomalies    Other (Comments):

## 2017-11-14 NOTE — ANESTHESIA PROCEDURE NOTES
Spinal    Start time: 11/14/2017 3:57 PM  Timeout: 11/14/2017 3:57 PM  End time: 11/14/2017 4:04 PM  Staffing  Anesthesiologist: BROOK ELLIOTT  Performed: anesthesiologist   Preanesthetic Checklist  Completed: patient identified, surgical consent, pre-op evaluation, timeout performed, IV checked, risks and benefits discussed and monitors and equipment checked  Spinal Block  Patient position: sitting  Prep: Betadine  Location: L3-4  Injection technique: single shot  CSF Fluid: clear free-flowing CSF  Needle  Needle type: pencil-tip   Needle gauge: 25 G  Needle length: 3.5 in  Needle localization: anatomical landmarks  Medications:  Bolus administered: 1.7 mL of 0.75 bupivacaine  Opioid administered: 200 mcg of   morphine

## 2017-11-14 NOTE — ANESTHESIA PREPROCEDURE EVALUATION
11/14/2017  Nicki Larson is a 31 y.o., female.    Anesthesia Evaluation    I have reviewed the Patient Summary Reports.    I have reviewed the Nursing Notes.   I have reviewed the Medications.     Review of Systems  Anesthesia Hx:  No problems with previous Anesthesia  History of prior surgery of interest to airway management or planning: Previous anesthesia: General Cochler Implant with general anesthesia.  Denies Family Hx of Anesthesia complications.   Denies Personal Hx of Anesthesia complications.   Social:  Non-Smoker, No Alcohol Use    Hematology/Oncology:  Hematology Normal   Oncology Normal     EENT/Dental:EENT/Dental Normal   Cardiovascular:  Cardiovascular Normal     Pulmonary:  Pulmonary Normal    Musculoskeletal:  Musculoskeletal Normal    Neurological:  Neurology Normal    Endocrine:  Endocrine Normal    Dermatological:  Skin Normal    Psych:  Psychiatric Normal           Physical Exam  General:  Well nourished    Airway/Jaw/Neck:  Airway Findings: Mouth Opening: Normal Tongue: Normal  General Airway Assessment: Adult  Mallampati: II  Improves to II with phonation.  TM Distance: Normal, at least 6 cm     Eyes/Ears/Nose:  Eyes/Ears/Nose Findings:    Dental:  Dental Findings: In tact        Mental Status:  Mental Status Findings:  Cooperative         Anesthesia Plan  Type of Anesthesia, risks & benefits discussed:  Anesthesia Type:  spinal  Patient's Preference:   Intra-op Monitoring Plan:   Intra-op Monitoring Plan Comments:   Post Op Pain Control Plan:   Post Op Pain Control Plan Comments:   Induction:   IV  Beta Blocker:  Patient is not currently on a Beta-Blocker (No further documentation required).       Informed Consent: Patient understands risks and agrees with Anesthesia plan.  Questions answered. Anesthesia consent signed with patient.  ASA Score: 2     Day of Surgery Review of  History & Physical: I have interviewed and examined the patient. I have reviewed the patient's H&P dated:            Ready For Surgery From Anesthesia Perspective.

## 2017-11-15 PROBLEM — Z34.93 NORMAL PREGNANCY IN THIRD TRIMESTER: Status: RESOLVED | Noted: 2017-10-20 | Resolved: 2017-11-15

## 2017-11-15 PROCEDURE — 25000003 PHARM REV CODE 250: Performed by: OBSTETRICS & GYNECOLOGY

## 2017-11-15 PROCEDURE — 11000001 HC ACUTE MED/SURG PRIVATE ROOM

## 2017-11-15 PROCEDURE — 99024 POSTOP FOLLOW-UP VISIT: CPT | Mod: ,,, | Performed by: PHYSICIAN ASSISTANT

## 2017-11-15 PROCEDURE — 63600175 PHARM REV CODE 636 W HCPCS: Performed by: OBSTETRICS & GYNECOLOGY

## 2017-11-15 RX ADMIN — KETOROLAC TROMETHAMINE 30 MG: 30 INJECTION, SOLUTION INTRAMUSCULAR at 12:11

## 2017-11-15 RX ADMIN — KETOROLAC TROMETHAMINE 30 MG: 30 INJECTION, SOLUTION INTRAMUSCULAR at 06:11

## 2017-11-15 RX ADMIN — KETOROLAC TROMETHAMINE 30 MG: 30 INJECTION, SOLUTION INTRAMUSCULAR at 11:11

## 2017-11-15 RX ADMIN — DOCUSATE SODIUM 100 MG: 100 CAPSULE, LIQUID FILLED ORAL at 08:11

## 2017-11-15 RX ADMIN — HYDROCODONE BITARTRATE AND ACETAMINOPHEN 1 TABLET: 5; 325 TABLET ORAL at 10:11

## 2017-11-15 RX ADMIN — IBUPROFEN 800 MG: 800 TABLET, FILM COATED ORAL at 06:11

## 2017-11-15 NOTE — ANESTHESIA RELEASE NOTE
"Anesthesia Release from PACU Note    Patient: Nicki Larson    Procedure(s) Performed: Procedure(s) (LRB):  DELIVERY- SECTION (N/A)    Anesthesia type: spinal    Post pain: Adequate analgesia    Post assessment: no apparent anesthetic complications    Last Vitals:   Visit Vitals  /72   Pulse 72   Temp 36.4 °C (97.6 °F) (Oral)   Resp 18   Ht 5' 3" (1.6 m)   Wt 88.9 kg (196 lb)   LMP 2017   SpO2 96%   Breastfeeding? Unknown   BMI 34.72 kg/m²       Post vital signs: stable    Level of consciousness: awake    Nausea/Vomiting: no nausea/no vomiting    Complications: none    Airway Patency: patent    Respiratory: unassisted    Cardiovascular: stable and blood pressure at baseline    Hydration: euvolemic  "

## 2017-11-15 NOTE — PROGRESS NOTES
Patient had a successful delivery via  section. Vital signs are stable. Bleeding has been scant to light. Bonding with infant appropriately.   Patient desires to breastfeed. Pain is rated at 0. Bruce catheter is in place, patent, and draining. Bruce care performed. Pads changed. SCDs are in place. Pitocin is infusing at 125ml/hr. Patient understands that she will not be able to ambulate until 12 hrs. after delivery time. Dressing is in place; dry and intact. Patient instructed to splint with pillow while coughing or sneezing to reduce pain. Patient verbalized understanding. All questions answered.

## 2017-11-15 NOTE — ANESTHESIA POSTPROCEDURE EVALUATION
"Anesthesia Post Evaluation    Patient: Nicki Larson    Procedure(s) Performed: Procedure(s) (LRB):  DELIVERY- SECTION (N/A)    Final Anesthesia Type: spinal  Patient location during evaluation: labor & delivery  Patient participation: Yes- Able to Participate  Level of consciousness: awake and alert  Post-procedure vital signs: reviewed and stable  Pain management: adequate  Airway patency: patent  PONV status at discharge: No PONV  Anesthetic complications: no      Cardiovascular status: blood pressure returned to baseline  Respiratory status: unassisted  Hydration status: euvolemic  Follow-up not needed.        Visit Vitals  /72   Pulse 72   Temp 36.4 °C (97.6 °F) (Oral)   Resp 18   Ht 5' 3" (1.6 m)   Wt 88.9 kg (196 lb)   LMP 2017   SpO2 96%   Breastfeeding? Unknown   BMI 34.72 kg/m²       Pain/Guru Score: No Data Recorded      "

## 2017-11-15 NOTE — TRANSFER OF CARE
"Anesthesia Transfer of Care Note    Patient: Nicki Larson    Procedure(s) Performed: Procedure(s) (LRB):  DELIVERY- SECTION (N/A)    Patient location: Labor and Delivery    Anesthesia Type: spinal    Transport from OR: Transported from OR on room air with adequate spontaneous ventilation    Post pain: adequate analgesia    Post assessment: no apparent anesthetic complications    Post vital signs: stable    Level of consciousness: awake and alert    Nausea/Vomiting: no nausea/vomiting    Complications: none    Transfer of care protocol was followed      Last vitals:   Visit Vitals  /72   Pulse 72   Temp 36.4 °C (97.6 °F) (Oral)   Resp 18   Ht 5' 3" (1.6 m)   Wt 88.9 kg (196 lb)   LMP 2017   SpO2 96%   Breastfeeding? Unknown   BMI 34.72 kg/m²     "

## 2017-11-15 NOTE — LACTATION NOTE
Visited mother; FOB at bedside, mother wishes him to translate in sign language.   Lactation packet given and admit information reviewed. Mother verbalizes understanding of expected  behaviors and output for the first 48 hours of life.  Discussed the importance of cue based feedings on demand, unrestricted access to the breast, and frequent uninterrupted skin to skin contact.  Risk and implications of artificial nipples and supplementation discussed.  Encouraged mother to call for assistance when desired or when infant is showing signs of hunger, contact number provided, mother verbalizes understanding.  Baby is showing feeding cues. Helped mother to settle in a football hold position on the left breast. Reviewed deep asymmetric latch and proper positioning. Mother is able to demonstrate back and deep latch easily obtained. Audible swallows noted, and mother denies pain or discomfort. Baby fed until content, and nipple shape and color is WDL upon unlatching. Reviewed hand expression and nipple care; mother able to return back demonstration.      11/15/17 1000   Maternal Infant Assessment   Breast Size Issue none   Breast Shape Bilateral:;round   Breast Density Bilateral:;soft   Areola Bilateral:;elastic   Nipple(s) Bilateral:;everted   Infant Assessment   Number of Stools (24 hours) 4   Number of Voids (24 hours) 2   LATCH Score   Latch 2-->grasps breast, tongue down, lips flanged, rhythmic sucking   Audible Swallowing 2-->spontaneous and intermittent (24 hrs old)   Type Of Nipple 2-->everted (after stimulation)   Comfort (Breast/Nipple) 2-->soft/nontender   Hold (Positioning) 1-->minimal assist, teach one side: mother does other, staff holds   Score (less than 7 for 2/more consecutive times, consult Lactation Consultant) 9   Pain/Comfort Assessments   Pain Assessment Performed Yes   Acceptable Comfort Level 7       Number Scale   Presence of Pain denies   Maternal Infant Feeding   Maternal Preparation breast  "care;hand hygiene   Maternal Emotional State assist needed   Infant Positioning clutch/"football"   Signs of Milk Transfer audible swallow   Presence of Pain no   Breastfeeding Education adequate infant intake;adequate milk volume;diet;importance of skin-to-skin contact;returning to work    Following Delivery yes     "

## 2017-11-15 NOTE — HOSPITAL COURSE
Routine postoperative care.  Patient will have  available in room. Expect no issues in her postpartum period.

## 2017-11-15 NOTE — PROGRESS NOTES
[facility] -   Obstetrics  Postpartum Progress Note    Patient Name: Nicki Larson  MRN: [id number]  Admission Date: 2017  Hospital Length of Stay: 1 days  Attending Physician: Silvestre Garber MD  Primary Care Provider: Primary Doctor No    Subjective:     Principal Problem:Status post primary low transverse  section    Hospital course: Routine postoperative care.  Patient will have  available in room. Expect no issues in her postpartum period.      Interval History: POD#1 Patient has no complaints, little to no pain, and she is ambulating to the restroom without difficulty.   in room this am; able to interpret/sign to the patient.      She is doing well this morning. She is tolerating a regular diet without nausea or vomiting. She is voiding spontaneously. She is ambulating. She has passed flatus, and has not a BM. Vaginal bleeding is mild. She denies fever or chills. Abdominal pain is mild and controlled with oral medications. She is breastfeeding. She desires circumcision for her male baby: not applicable- Baby girl, Stone Gtz     Objective:     Vital Signs (Most Recent):  Temp: 98.6 °F (37 °C) (11/15/17 0400)  Pulse: 102 (11/15/17 0400)  Resp: 18 (11/15/17 0400)  BP: 115/60 (11/15/17 0400)  SpO2: 96 % (11/15/17 0400) Vital Signs (24h Range):  Temp:  [97.6 °F (36.4 °C)-98.9 °F (37.2 °C)] 98.6 °F (37 °C)  Pulse:  [] 102  Resp:  [18-20] 18  SpO2:  [79 %-100 %] 96 %  BP: (108-140)/(54-91) 115/60     Weight: 88.9 kg (196 lb)  Body mass index is 34.72 kg/m².      Intake/Output Summary (Last 24 hours) at 11/15/17 1024  Last data filed at 11/15/17 0425   Gross per 24 hour   Intake             2500 ml   Output             1100 ml   Net             1400 ml       Significant Labs:  Lab Results   Component Value Date    GROUPTRH A POS 2017    HEPBSAG Negative 2017    STREPBCULT No Group B Streptococcus isolated 10/20/2017       Recent  Labs  Lab 17  1430   HGB 12.1   HCT 36.3*       I have personallly reviewed all pertinent lab results from the last 24 hours.    Physical Exam:   Constitutional: She is oriented to person, place, and time. She appears well-developed and well-nourished.    HENT:   Head: Normocephalic.   Complete deafness      Neck: Normal range of motion. No thyromegaly present.    Cardiovascular: Normal rate and regular rhythm.     Pulmonary/Chest: Effort normal and breath sounds normal.        Abdominal: Soft. Bowel sounds are normal. She exhibits abdominal incision (aquasel dressing clean dry & intact).     Genitourinary:   Genitourinary Comments: Fundus nontender near the umbilicus             Musculoskeletal: Normal range of motion and moves all extremeties.       Neurological: She is alert and oriented to person, place, and time.    Skin: Skin is warm.    Psychiatric: She has a normal mood and affect.       Assessment/Plan:     31 y.o. female  for:    * Status post primary low transverse  section    s/p primary LTCS.  Performed due to risks associated with patient's condition, Usher Syndrome, and the possibility of diminished vision or blindness during active labor.      Baby girl and mom are doing well with no complaints this morning.  Patient is progressing well with routine postpartum care.  She was encouraged to ambulate the halls today and anticipate she will be ready to go home on POD #2.                Disposition: As patient meets milestones, will plan to discharge tomorrow.     Clementina Guerra PA-C  Obstetrics  [facility] -

## 2017-11-15 NOTE — ASSESSMENT & PLAN NOTE
s/p primary LTCS.  Performed due to risks associated with patient's condition, Usher Syndrome, and the possibility of diminished vision or blindness during active labor.      Baby girl and mom are doing well with no complaints this morning.  Patient is progressing well with routine postpartum care.  She was encouraged to ambulate the halls today and anticipate she will be ready to go home on POD #2.

## 2017-11-15 NOTE — PLAN OF CARE
Problem: Patient Care Overview  Goal: Plan of Care Review  Outcome: Ongoing (interventions implemented as appropriate)  Pt afebrile and had no falls this shift. Fundus firm w/out massage and below umbilicus. Bleeding light, no clots passed this shift. Voids via chavez; plan to get up out of bed this AM. Pain well controlled with IV toradol. VSS at this time. Bonding well with infant; responds to infant cues and participates in infant care. BF infant, needs minimal assistance with latching; needs reinforcement with BF education. Educated on how to promote a deep latch; pt demonstrated. No other questions or concerns at this time. Will continue to monitor.

## 2017-11-15 NOTE — SUBJECTIVE & OBJECTIVE
Hospital course: Routine postoperative care.  Patient will have  available in room. Expect no issues in her postpartum period.      Interval History: POD#1 Patient has no complaints, little to no pain, and she is ambulating to the restroom without difficulty.   in room this am; able to interpret/sign to the patient.      She is doing well this morning. She is tolerating a regular diet without nausea or vomiting. She is voiding spontaneously. She is ambulating. She has passed flatus, and has not a BM. Vaginal bleeding is mild. She denies fever or chills. Abdominal pain is mild and controlled with oral medications. She is breastfeeding. She desires circumcision for her male baby: not applicable- Baby girl, Stone Gtz     Objective:     Vital Signs (Most Recent):  Temp: 98.6 °F (37 °C) (11/15/17 0400)  Pulse: 102 (11/15/17 0400)  Resp: 18 (11/15/17 0400)  BP: 115/60 (11/15/17 0400)  SpO2: 96 % (11/15/17 0400) Vital Signs (24h Range):  Temp:  [97.6 °F (36.4 °C)-98.9 °F (37.2 °C)] 98.6 °F (37 °C)  Pulse:  [] 102  Resp:  [18-20] 18  SpO2:  [79 %-100 %] 96 %  BP: (108-140)/(54-91) 115/60     Weight: 88.9 kg (196 lb)  Body mass index is 34.72 kg/m².      Intake/Output Summary (Last 24 hours) at 11/15/17 1024  Last data filed at 11/15/17 0425   Gross per 24 hour   Intake             2500 ml   Output             1100 ml   Net             1400 ml       Significant Labs:  Lab Results   Component Value Date    GROUPTRH A POS 11/14/2017    HEPBSAG Negative 03/16/2017    STREPBCULT No Group B Streptococcus isolated 10/20/2017       Recent Labs  Lab 11/14/17  1430   HGB 12.1   HCT 36.3*       I have personallly reviewed all pertinent lab results from the last 24 hours.    Physical Exam:   Constitutional: She is oriented to person, place, and time. She appears well-developed and well-nourished.    HENT:   Head: Normocephalic.   Complete deafness      Neck: Normal range of motion. No  thyromegaly present.    Cardiovascular: Normal rate and regular rhythm.     Pulmonary/Chest: Effort normal and breath sounds normal.        Abdominal: Soft. Bowel sounds are normal. She exhibits abdominal incision (aquasel dressing clean dry & intact).     Genitourinary:   Genitourinary Comments: Fundus nontender near the umbilicus             Musculoskeletal: Normal range of motion and moves all extremeties.       Neurological: She is alert and oriented to person, place, and time.    Skin: Skin is warm.    Psychiatric: She has a normal mood and affect.

## 2017-11-16 VITALS
TEMPERATURE: 98 F | HEART RATE: 84 BPM | BODY MASS INDEX: 34.73 KG/M2 | OXYGEN SATURATION: 98 % | DIASTOLIC BLOOD PRESSURE: 71 MMHG | HEIGHT: 63 IN | WEIGHT: 196 LBS | RESPIRATION RATE: 18 BRPM | SYSTOLIC BLOOD PRESSURE: 112 MMHG

## 2017-11-16 PROCEDURE — 99024 POSTOP FOLLOW-UP VISIT: CPT | Mod: ,,, | Performed by: PHYSICIAN ASSISTANT

## 2017-11-16 PROCEDURE — 25000003 PHARM REV CODE 250: Performed by: OBSTETRICS & GYNECOLOGY

## 2017-11-16 RX ORDER — HYDROCODONE BITARTRATE AND ACETAMINOPHEN 5; 325 MG/1; MG/1
1 TABLET ORAL EVERY 6 HOURS PRN
Qty: 20 TABLET | Refills: 0 | Status: SHIPPED | OUTPATIENT
Start: 2017-11-16 | End: 2021-07-30

## 2017-11-16 RX ORDER — IBUPROFEN 800 MG/1
800 TABLET ORAL EVERY 8 HOURS
Qty: 40 TABLET | Refills: 1 | Status: SHIPPED | OUTPATIENT
Start: 2017-11-16 | End: 2021-09-07

## 2017-11-16 RX ADMIN — HYDROCODONE BITARTRATE AND ACETAMINOPHEN 1 TABLET: 5; 325 TABLET ORAL at 08:11

## 2017-11-16 RX ADMIN — DOCUSATE SODIUM 100 MG: 100 CAPSULE, LIQUID FILLED ORAL at 08:11

## 2017-11-16 RX ADMIN — HYDROCODONE BITARTRATE AND ACETAMINOPHEN 1 TABLET: 10; 325 TABLET ORAL at 03:11

## 2017-11-16 NOTE — DISCHARGE INSTRUCTIONS
"Mother Self Care:    Activity: Avoid strenuous exercise and get adequate rest.  No driving until the physician consent given.  Emotional Changes: Most women find birth to be a time of great emotional upheaval.  Sense of loss, mood swings, fatigue, anxiety, and feeling "let down" are common.  If feelings worsen or last more than a week, call your physician.  Breast Care/Breastfeeding: Wear a bra for comfort.  Keep nipples dry and apply your own breast milk or lanolin cream as needed for soreness.  Engorgement can be relieved with warm, moist heat before feedings.  You may also take Ibuprofen.  Breast Care/Bottle Feeding: Wear support bra 24 hours a day for one week.  Avoid stimulation to breasts.  You may use ice packs for discomfort.  Higinio-Care/Vaginal Bleeding: Remember to use your higinio-bottle after urinating.  Your flow will change from red, to pink, to yellow/white color over a period of 2 weeks.  Menstruation will return in 3-8 weeks, or longer if breastfeeding.  Episiotomy Vaginal Delivery: Stitches will dissolve within 10 days to 3 weeks.  Warm baths, tucks, and dermoplast will promote healing.  Avoid bubble baths or strong soaps.   Section/Tubal Ligation: Keep incision clean and dry.  Please remove steri-strips in 5-7 days.  You may shower, but avoid baths.  Sexual Activity/Pelvic Rest: No sexual activity, tampons, or douching until your physician gives you consent.  Diet: Continue to eat from the five basic food groups, including plenty of protein, fruits, vegetables, and whole grains.  Limit empty calories and high fat foods.  Drink enough fluids to satisfy thirst and add an extra 500 calories for breastfeeding.  Constipation/Hemorrhoids: Drink plenty of water.  You may take a stool softener or natural laxative (Metamucil). You may use tucks or hemorrhoid ointment and soak in a warm tub.    CALL YOUR OB DOCTOR IF ANY OF THE FOLLOWING OCCURS:  *Heavy bleeding - saturating a pad an hour or passing any " large (2-3 inches in size) blood clots.  *Any pain, redness, or tenderness in lower leg.  *You cannot care for yourself or your baby.  *Any signs of infection-      - Temperature greater than 100.5 degrees F      - Foul smelling vaginal discharge and/or incisional drainage      - Increased episiotomy or incisional pain      - Hot, hard, red or sore area on breast      - Flu-like symptoms      - Any urgency, frequency or burning with urination

## 2017-11-16 NOTE — LACTATION NOTE
11/16/17 0930   Infant Assessment   Weight Loss (%) -6.2   Number of Stools (24 hours) 3   Number of Voids (24 hours) 4   LATCH Score   Latch 2-->grasps breast, tongue down, lips flanged, rhythmic sucking   Audible Swallowing 2-->spontaneous and intermittent (24 hrs old)   Type Of Nipple 2-->everted (after stimulation)   Comfort (Breast/Nipple) 1-->filling, red/small blisters/bruises, mild/mod discomfort   Hold (Positioning) 2-->no assist from staff, mother able to position/hold infant   Score (less than 7 for 2/more consecutive times, consult Lactation Consultant) 9        11/16/17 0930   Infant Assessment   Weight Loss (%) -6.2   Number of Stools (24 hours) 3   Number of Voids (24 hours) 4   LATCH Score   Latch 2-->grasps breast, tongue down, lips flanged, rhythmic sucking   Audible Swallowing 2-->spontaneous and intermittent (24 hrs old)   Type Of Nipple 2-->everted (after stimulation)   Comfort (Breast/Nipple) 1-->filling, red/small blisters/bruises, mild/mod discomfort   Hold (Positioning) 2-->no assist from staff, mother able to position/hold infant   Score (less than 7 for 2/more consecutive times, consult Lactation Consultant) 9

## 2017-11-16 NOTE — DISCHARGE SUMMARY
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  Pt report no complaints.  Is ready for surgery.    Procedure(s) (LRB):  DELIVERY- SECTION (N/A)     Hospital Course:   Routine postoperative care.  Patient will have  available in room. Expect no issues in her postpartum period.          Final Active Diagnoses:    Diagnosis Date Noted POA    PRINCIPAL PROBLEM:  Status post primary low transverse  section [Z98.891] 2017 Not Applicable      Problems Resolved During this Admission:    Diagnosis Date Noted Date Resolved POA    Normal pregnancy in third trimester [Z34.93] 10/20/2017 11/15/2017 Not Applicable        Labs: All labs within the past 24 hours have been reviewed    Feeding Method: breast    Immunizations     Date Immunization Status Dose Route/Site Given by    17 MMR Incomplete 0.5 mL Subcutaneous/Left deltoid     17 Tdap Incomplete 0.5 mL Intramuscular/Left deltoid           Delivery:    Episiotomy: None   Lacerations: None   Repair suture:     Repair # of packets:     Blood loss (ml): 0     Birth information:  YOB: 2018   Time of birth: 4:22 PM   Sex: female   Delivery type: , Low Transverse   Gestational Age: 39w6d    Delivery Clinician:      Other providers:       Additional  information:  Forceps:    Vacuum:    Breech:    Observed anomalies      Living?:           APGARS  One minute Five minutes Ten minutes   Skin color:         Heart rate:         Grimace:         Muscle tone:         Breathing:         Totals: 9  9        Placenta:  Delivered:       appearance    Pending Diagnostic Studies:     None        Physical Exam:   Constitutional: She is oriented to person, place, and time. She appears well-developed and well-nourished.    HENT:   Head: Normocephalic.   Complete deafness      Neck: Normal range of motion. No thyromegaly present.    Cardiovascular: Normal rate and regular rhythm.     Pulmonary/Chest: Effort normal and breath sounds normal.      Abdominal: Soft. Bowel sounds are normal. She exhibits abdominal incision (aquasel dressing clean dry & intact).     Genitourinary:   Genitourinary Comments: Fundus nontender near the umbilicus  Musculoskeletal: Normal range of motion and moves all extremeties.       Neurological: She is alert and oriented to person, place, and time.    Skin: Skin is warm.    Psychiatric: She has a normal mood and affect.     Discharged Condition: good    Disposition: Home or Self Care    Follow Up:  Follow-up Information     OB GYN NURSE, MADHURI. Go on 11/21/2017.    Why:  2:30 Appt for Bandage removal with Nurse           Silvestre Garber MD. Go on 12/5/2017.    Specialty:  Obstetrics and Gynecology  Why:  2:00 pm Post-op visit at [facility] location    Contact information:  [address]  [facility] [address]  [phone]                 Patient Instructions:     Diet general     Activity as tolerated     Call MD for:  temperature >100.4     Call MD for:  severe uncontrolled pain     Call MD for:  redness, tenderness, or signs of infection (pain, swelling, redness, odor or green/yellow discharge around incision site)     Call MD for:  severe persistent headache     Call MD for:  persistent dizziness, light-headedness, or visual disturbances     Leave dressing on - Keep it clean, dry, and intact until clinic visit   Order Comments: Showers only- no baths.       Medications:  Current Discharge Medication List      START taking these medications    Details   hydrocodone-acetaminophen 5-325mg (NORCO) 5-325 mg per  tablet Take 1 tablet by mouth every 6 (six) hours as needed for Pain.  Qty: 20 tablet, Refills: 0    Comments: Please deliver to patient's hospital room when ready. Patient to be discharged today.  Associated Diagnoses: Status post primary low transverse  section      ibuprofen (ADVIL,MOTRIN) 800 MG tablet Take 1 tablet (800 mg total) by mouth every 8 (eight) hours.  Qty: 40 tablet, Refills: 1    Comments: Please deliver to patient's hospital room when ready. Patient to be discharged today.  Associated Diagnoses: Status post primary low transverse  section         CONTINUE these medications which have NOT CHANGED    Details   calcium carbonate (TUMS) 200 mg calcium (500 mg) chewable tablet Take 1 tablet by mouth once daily.      DOCOSAHEXANOIC ACID (DHA ALGAL-900 ORAL) Take by mouth.      folic acid (FOLVITE) 800 MCG Tab Take 800 mcg by mouth once daily.      meclizine (ANTIVERT) 25 mg tablet Take 1 tablet (25 mg total) by mouth 3 (three) times daily as needed.  Qty: 30 tablet, Refills: 0    Associated Diagnoses: Vertigo      scopolamine (TRANSDERM-SCOP) 1.5 mg (1 mg over 3 days) Place 1 patch (1.5 mg total) onto the skin every 72 hours.  Qty: 4 patch, Refills: 1    Associated Diagnoses: Vertigo      vitamin A 8000 UNIT capsule Take 8,000 Units by mouth once daily.             Clementina Guerra PA-C  Obstetrics  [facility] -

## 2017-11-16 NOTE — LACTATION NOTE
Lactation Rounds:     present during encounter.     Weight loss -6.2%. 4 voids and 3 stools. Baby is showing feeding cues. Helped mother to settle in a football hold position on the right breast. Reviewed deep asymmetric latch and proper positioning. Mother is able to demonstrate back and deep latch easily obtained. Audible swallows noted, and mother denies pain or discomfort. Baby fed until content, and nipple shape and color upon unlatching appears to have 1 small flesh colored blister at tip of nipple. Reviewed hand expression and nipple care; mother able to return back demonstration.     Mother given contract rental form to fill out . Call lactation when information is filled out to rent pump.

## 2017-11-16 NOTE — PLAN OF CARE
Problem: Patient Care Overview  Goal: Plan of Care Review  Outcome: Ongoing (interventions implemented as appropriate)  Patient is progressing well. VSS. Pain controlled by Motrin. Breastfeeding is going good. Mom is deaf and uses an .  in room. He is deaf also but reads lips and helps interpret. Bleeding is light. Aquacel dressing has some scant dried blood on it. Will continue to monitor.

## 2017-11-16 NOTE — LACTATION NOTE
Lactation Rounds:     present for encounter.     Upon arrival, mother independently positioned infant in football hold  on the leftbreast. Reviewed deep asymmetric latch and proper positioning. Mother is able to demonstrate back and deep latch easily obtained. Audible swallows noted, and mother denies pain or discomfort. Feeding in progress.    Weight loss addressed and instructed mother to feed at least 8-12 times in 24 hours. Mother verbalized understanding.    Mother decided not to go home with rental pump. Explained if she changed her mind to call lactation.     Lactation discharge information reviewed.  Mother is aware of warm line, and outpatient consultations and monthly support gatherings. Encouraged mother to contact lactation with any questions, concerns, or problems. Contact numbers provided, and mother verbalizes understanding.

## 2017-11-17 ENCOUNTER — PATIENT MESSAGE (OUTPATIENT)
Dept: OBSTETRICS AND GYNECOLOGY | Facility: CLINIC | Age: 32
End: 2017-11-17

## 2017-11-21 ENCOUNTER — CLINICAL SUPPORT (OUTPATIENT)
Dept: OBSTETRICS AND GYNECOLOGY | Facility: CLINIC | Age: 32
End: 2017-11-21
Payer: COMMERCIAL

## 2017-12-05 ENCOUNTER — OFFICE VISIT (OUTPATIENT)
Dept: OBSTETRICS AND GYNECOLOGY | Facility: CLINIC | Age: 32
End: 2017-12-05
Payer: COMMERCIAL

## 2017-12-05 ENCOUNTER — PATIENT MESSAGE (OUTPATIENT)
Dept: OBSTETRICS AND GYNECOLOGY | Facility: CLINIC | Age: 32
End: 2017-12-05

## 2017-12-05 VITALS
SYSTOLIC BLOOD PRESSURE: 110 MMHG | WEIGHT: 176.81 LBS | BODY MASS INDEX: 31.33 KG/M2 | HEIGHT: 63 IN | DIASTOLIC BLOOD PRESSURE: 82 MMHG

## 2017-12-05 DIAGNOSIS — Z98.891 STATUS POST PRIMARY LOW TRANSVERSE CESAREAN SECTION: Primary | ICD-10-CM

## 2017-12-05 PROCEDURE — 99999 PR PBB SHADOW E&M-EST. PATIENT-LVL II: CPT | Mod: PBBFAC,,, | Performed by: OBSTETRICS & GYNECOLOGY

## 2017-12-05 PROCEDURE — 0503F POSTPARTUM CARE VISIT: CPT | Mod: S$GLB,,, | Performed by: OBSTETRICS & GYNECOLOGY

## 2017-12-05 NOTE — PROGRESS NOTES
"CC: Post-partum follow-up    Nicki Larson is a 31 y.o. female  who presents for post-partum visit.  She is S/P a PLTCS.  She and the baby are doing well.  No pain.  No fever.   No bowel / bladder complaints.    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  CARDIOVASCULAR: No chest pain. No shortness of breath. No leg cramps.  BREAST: Denies pain. No lumps. No discharge.  ABDOMEN: No abdominal pain. Denies nausea. Denies vomiting. No diarrhea. No constipation  URINARY: No incontinence, no nocturia, no frequency and no dysuria.  VULVAR: No pain, no lesions and no itching.  VAGINAL: No relaxation, no itching, no discharge, no abnormal bleeding and no lesions.  NEUROLOGICAL: No headaches. No vision changes.    PHYSICAL EXAM:  GENERAL: Alert and oriented in no distress  CHEST: Clear to auscultation  CV; Regular rate and rhythm  ABDOMEN:  Soft, non-tender, non-distended  WOUND: Healed well  VULVA:  Normal, no lesions  CERVIX:  Without lesions, polyps or tenderness.  UTERUS:  Normal size, shape, consistency, no mass or tenderness.  ADNEXA:  Normal in size without mass or tenderness  EXTREMITIES: Non-tender, Negative Vonnie's    IMP:  Doing well S/P PLTCS - Instructions / precautions reviewed  Contraceptive counseling - desires to think about options      PLAN:  May resume normal activities  Return: annual exam  "

## 2017-12-11 ENCOUNTER — PATIENT MESSAGE (OUTPATIENT)
Dept: OBSTETRICS AND GYNECOLOGY | Facility: CLINIC | Age: 32
End: 2017-12-11

## 2017-12-21 ENCOUNTER — PATIENT MESSAGE (OUTPATIENT)
Dept: OBSTETRICS AND GYNECOLOGY | Facility: CLINIC | Age: 32
End: 2017-12-21

## 2021-07-19 ENCOUNTER — TELEPHONE (OUTPATIENT)
Dept: OBSTETRICS AND GYNECOLOGY | Facility: CLINIC | Age: 36
End: 2021-07-19

## 2021-07-30 ENCOUNTER — OFFICE VISIT (OUTPATIENT)
Dept: OBSTETRICS AND GYNECOLOGY | Facility: CLINIC | Age: 36
End: 2021-07-30
Payer: COMMERCIAL

## 2021-07-30 ENCOUNTER — LAB VISIT (OUTPATIENT)
Dept: LAB | Facility: HOSPITAL | Age: 36
End: 2021-07-30
Attending: ADVANCED PRACTICE MIDWIFE
Payer: COMMERCIAL

## 2021-07-30 VITALS
DIASTOLIC BLOOD PRESSURE: 62 MMHG | HEIGHT: 63 IN | SYSTOLIC BLOOD PRESSURE: 114 MMHG | WEIGHT: 169.06 LBS | BODY MASS INDEX: 29.95 KG/M2

## 2021-07-30 DIAGNOSIS — Z32.01 POSITIVE PREGNANCY TEST: Primary | ICD-10-CM

## 2021-07-30 DIAGNOSIS — Z98.891 PREVIOUS CESAREAN SECTION: ICD-10-CM

## 2021-07-30 DIAGNOSIS — Z32.01 POSITIVE PREGNANCY TEST: ICD-10-CM

## 2021-07-30 LAB
ABO + RH BLD: NORMAL
BASOPHILS # BLD AUTO: 0.02 K/UL (ref 0–0.2)
BASOPHILS NFR BLD: 0.3 % (ref 0–1.9)
BLD GP AB SCN CELLS X3 SERPL QL: NORMAL
DIFFERENTIAL METHOD: ABNORMAL
EOSINOPHIL # BLD AUTO: 0.1 K/UL (ref 0–0.5)
EOSINOPHIL NFR BLD: 1.3 % (ref 0–8)
ERYTHROCYTE [DISTWIDTH] IN BLOOD BY AUTOMATED COUNT: 12.2 % (ref 11.5–14.5)
HCT VFR BLD AUTO: 35.5 % (ref 37–48.5)
HGB BLD-MCNC: 12.1 G/DL (ref 12–16)
IMM GRANULOCYTES # BLD AUTO: 0.02 K/UL (ref 0–0.04)
IMM GRANULOCYTES NFR BLD AUTO: 0.3 % (ref 0–0.5)
LYMPHOCYTES # BLD AUTO: 1.6 K/UL (ref 1–4.8)
LYMPHOCYTES NFR BLD: 25.8 % (ref 18–48)
MCH RBC QN AUTO: 31.3 PG (ref 27–31)
MCHC RBC AUTO-ENTMCNC: 34.1 G/DL (ref 32–36)
MCV RBC AUTO: 92 FL (ref 82–98)
MONOCYTES # BLD AUTO: 0.5 K/UL (ref 0.3–1)
MONOCYTES NFR BLD: 8.4 % (ref 4–15)
NEUTROPHILS # BLD AUTO: 4 K/UL (ref 1.8–7.7)
NEUTROPHILS NFR BLD: 63.9 % (ref 38–73)
NRBC BLD-RTO: 0 /100 WBC
PLATELET # BLD AUTO: 202 K/UL (ref 150–450)
PMV BLD AUTO: 11.4 FL (ref 9.2–12.9)
RBC # BLD AUTO: 3.86 M/UL (ref 4–5.4)
WBC # BLD AUTO: 6.31 K/UL (ref 3.9–12.7)

## 2021-07-30 PROCEDURE — 85025 COMPLETE CBC W/AUTO DIFF WBC: CPT | Performed by: ADVANCED PRACTICE MIDWIFE

## 2021-07-30 PROCEDURE — 1159F PR MEDICATION LIST DOCUMENTED IN MEDICAL RECORD: ICD-10-PCS | Mod: CPTII,S$GLB,, | Performed by: ADVANCED PRACTICE MIDWIFE

## 2021-07-30 PROCEDURE — 86592 SYPHILIS TEST NON-TREP QUAL: CPT | Performed by: ADVANCED PRACTICE MIDWIFE

## 2021-07-30 PROCEDURE — 88175 CYTOPATH C/V AUTO FLUID REDO: CPT | Performed by: ADVANCED PRACTICE MIDWIFE

## 2021-07-30 PROCEDURE — 1126F AMNT PAIN NOTED NONE PRSNT: CPT | Mod: CPTII,S$GLB,, | Performed by: ADVANCED PRACTICE MIDWIFE

## 2021-07-30 PROCEDURE — 99203 PR OFFICE/OUTPT VISIT, NEW, LEVL III, 30-44 MIN: ICD-10-PCS | Mod: S$GLB,,, | Performed by: ADVANCED PRACTICE MIDWIFE

## 2021-07-30 PROCEDURE — 3074F PR MOST RECENT SYSTOLIC BLOOD PRESSURE < 130 MM HG: ICD-10-PCS | Mod: CPTII,S$GLB,, | Performed by: ADVANCED PRACTICE MIDWIFE

## 2021-07-30 PROCEDURE — 87591 N.GONORRHOEAE DNA AMP PROB: CPT | Performed by: ADVANCED PRACTICE MIDWIFE

## 2021-07-30 PROCEDURE — 87340 HEPATITIS B SURFACE AG IA: CPT | Performed by: ADVANCED PRACTICE MIDWIFE

## 2021-07-30 PROCEDURE — 1159F MED LIST DOCD IN RCRD: CPT | Mod: CPTII,S$GLB,, | Performed by: ADVANCED PRACTICE MIDWIFE

## 2021-07-30 PROCEDURE — 99999 PR PBB SHADOW E&M-EST. PATIENT-LVL III: ICD-10-PCS | Mod: PBBFAC,,, | Performed by: ADVANCED PRACTICE MIDWIFE

## 2021-07-30 PROCEDURE — 36415 COLL VENOUS BLD VENIPUNCTURE: CPT | Performed by: ADVANCED PRACTICE MIDWIFE

## 2021-07-30 PROCEDURE — 3078F DIAST BP <80 MM HG: CPT | Mod: CPTII,S$GLB,, | Performed by: ADVANCED PRACTICE MIDWIFE

## 2021-07-30 PROCEDURE — 86762 RUBELLA ANTIBODY: CPT | Performed by: ADVANCED PRACTICE MIDWIFE

## 2021-07-30 PROCEDURE — 99203 OFFICE O/P NEW LOW 30 MIN: CPT | Mod: S$GLB,,, | Performed by: ADVANCED PRACTICE MIDWIFE

## 2021-07-30 PROCEDURE — 3008F BODY MASS INDEX DOCD: CPT | Mod: CPTII,S$GLB,, | Performed by: ADVANCED PRACTICE MIDWIFE

## 2021-07-30 PROCEDURE — 86900 BLOOD TYPING SEROLOGIC ABO: CPT | Performed by: ADVANCED PRACTICE MIDWIFE

## 2021-07-30 PROCEDURE — 3074F SYST BP LT 130 MM HG: CPT | Mod: CPTII,S$GLB,, | Performed by: ADVANCED PRACTICE MIDWIFE

## 2021-07-30 PROCEDURE — 99999 PR PBB SHADOW E&M-EST. PATIENT-LVL III: CPT | Mod: PBBFAC,,, | Performed by: ADVANCED PRACTICE MIDWIFE

## 2021-07-30 PROCEDURE — 3078F PR MOST RECENT DIASTOLIC BLOOD PRESSURE < 80 MM HG: ICD-10-PCS | Mod: CPTII,S$GLB,, | Performed by: ADVANCED PRACTICE MIDWIFE

## 2021-07-30 PROCEDURE — 87389 HIV-1 AG W/HIV-1&-2 AB AG IA: CPT | Performed by: ADVANCED PRACTICE MIDWIFE

## 2021-07-30 PROCEDURE — 87491 CHLMYD TRACH DNA AMP PROBE: CPT | Performed by: ADVANCED PRACTICE MIDWIFE

## 2021-07-30 PROCEDURE — 3008F PR BODY MASS INDEX (BMI) DOCUMENTED: ICD-10-PCS | Mod: CPTII,S$GLB,, | Performed by: ADVANCED PRACTICE MIDWIFE

## 2021-07-30 PROCEDURE — 1126F PR PAIN SEVERITY QUANTIFIED, NO PAIN PRESENT: ICD-10-PCS | Mod: CPTII,S$GLB,, | Performed by: ADVANCED PRACTICE MIDWIFE

## 2021-07-30 RX ORDER — ONDANSETRON 4 MG/1
4 TABLET, FILM COATED ORAL DAILY PRN
Qty: 30 TABLET | Refills: 1 | Status: ON HOLD | OUTPATIENT
Start: 2021-07-30 | End: 2022-03-04 | Stop reason: HOSPADM

## 2021-07-31 LAB — RPR SER QL: NORMAL

## 2021-08-02 ENCOUNTER — PATIENT MESSAGE (OUTPATIENT)
Dept: OBSTETRICS AND GYNECOLOGY | Facility: CLINIC | Age: 36
End: 2021-08-02

## 2021-08-02 LAB
HBV SURFACE AG SERPL QL IA: NEGATIVE
HIV 1+2 AB+HIV1 P24 AG SERPL QL IA: NEGATIVE
RUBV IGG SER-ACNC: 44.4 IU/ML
RUBV IGG SER-IMP: REACTIVE

## 2021-08-04 LAB
C TRACH DNA SPEC QL NAA+PROBE: NOT DETECTED
CLINICAL INFO: NORMAL
CYTO CVX: NORMAL
CYTOLOGIST CVX/VAG CYTO: NORMAL
CYTOLOGIST CVX/VAG CYTO: NORMAL
CYTOLOGY CMNT CVX/VAG CYTO-IMP: NORMAL
CYTOLOGY PAP THIN PREP EXPLANATION: NORMAL
DATE OF PREVIOUS PAP: NORMAL
DATE PREVIOUS BX: NO
GEN CATEG CVX/VAG CYTO-IMP: NORMAL
LMP START DATE: NORMAL
MICROORGANISM CVX/VAG CYTO: NORMAL
N GONORRHOEA DNA SPEC QL NAA+PROBE: NOT DETECTED
PATHOLOGIST CVX/VAG CYTO: NORMAL
SERVICE CMNT-IMP: NORMAL
SPECIMEN SOURCE CVX/VAG CYTO: NORMAL
STAT OF ADQ CVX/VAG CYTO-IMP: NORMAL

## 2021-08-13 ENCOUNTER — PROCEDURE VISIT (OUTPATIENT)
Dept: OBSTETRICS AND GYNECOLOGY | Facility: CLINIC | Age: 36
End: 2021-08-13
Payer: COMMERCIAL

## 2021-08-13 ENCOUNTER — INITIAL PRENATAL (OUTPATIENT)
Dept: OBSTETRICS AND GYNECOLOGY | Facility: CLINIC | Age: 36
End: 2021-08-13
Payer: COMMERCIAL

## 2021-08-13 VITALS
WEIGHT: 169.56 LBS | SYSTOLIC BLOOD PRESSURE: 100 MMHG | DIASTOLIC BLOOD PRESSURE: 70 MMHG | BODY MASS INDEX: 30.03 KG/M2

## 2021-08-13 DIAGNOSIS — Z3A.10 10 WEEKS GESTATION OF PREGNANCY: ICD-10-CM

## 2021-08-13 DIAGNOSIS — H90.3 USHER SYNDROME: ICD-10-CM

## 2021-08-13 DIAGNOSIS — Z98.891 PREVIOUS CESAREAN SECTION: Primary | ICD-10-CM

## 2021-08-13 DIAGNOSIS — H35.52 USHER SYNDROME: ICD-10-CM

## 2021-08-13 DIAGNOSIS — H90.3 USHER'S SYNDROME: ICD-10-CM

## 2021-08-13 DIAGNOSIS — Z32.01 POSITIVE PREGNANCY TEST: ICD-10-CM

## 2021-08-13 DIAGNOSIS — H35.52 USHER'S SYNDROME: ICD-10-CM

## 2021-08-13 DIAGNOSIS — H91.90 DEAFNESS, UNSPECIFIED LATERALITY: ICD-10-CM

## 2021-08-13 DIAGNOSIS — Z98.891 PREVIOUS CESAREAN SECTION: ICD-10-CM

## 2021-08-13 PROCEDURE — 0502F PR SUBSEQUENT PRENATAL CARE: ICD-10-PCS | Mod: CPTII,S$GLB,, | Performed by: ADVANCED PRACTICE MIDWIFE

## 2021-08-13 PROCEDURE — 99999 PR PBB SHADOW E&M-EST. PATIENT-LVL II: CPT | Mod: PBBFAC,,, | Performed by: ADVANCED PRACTICE MIDWIFE

## 2021-08-13 PROCEDURE — 76801 OB US < 14 WKS SINGLE FETUS: CPT | Mod: S$GLB,,, | Performed by: OBSTETRICS & GYNECOLOGY

## 2021-08-13 PROCEDURE — 0502F SUBSEQUENT PRENATAL CARE: CPT | Mod: CPTII,S$GLB,, | Performed by: ADVANCED PRACTICE MIDWIFE

## 2021-08-13 PROCEDURE — 76801 PR US, OB <14WKS, TRANSABD, SINGLE GESTATION: ICD-10-PCS | Mod: S$GLB,,, | Performed by: OBSTETRICS & GYNECOLOGY

## 2021-08-13 PROCEDURE — 99999 PR PBB SHADOW E&M-EST. PATIENT-LVL II: ICD-10-PCS | Mod: PBBFAC,,, | Performed by: ADVANCED PRACTICE MIDWIFE

## 2021-08-25 ENCOUNTER — PATIENT MESSAGE (OUTPATIENT)
Dept: ADMINISTRATIVE | Facility: OTHER | Age: 36
End: 2021-08-25

## 2021-09-01 ENCOUNTER — PATIENT MESSAGE (OUTPATIENT)
Dept: ADMINISTRATIVE | Facility: OTHER | Age: 36
End: 2021-09-01

## 2021-09-07 ENCOUNTER — ROUTINE PRENATAL (OUTPATIENT)
Dept: OBSTETRICS AND GYNECOLOGY | Facility: CLINIC | Age: 36
End: 2021-09-07
Payer: COMMERCIAL

## 2021-09-07 VITALS — DIASTOLIC BLOOD PRESSURE: 66 MMHG | BODY MASS INDEX: 29.8 KG/M2 | WEIGHT: 168.19 LBS | SYSTOLIC BLOOD PRESSURE: 104 MMHG

## 2021-09-07 DIAGNOSIS — O34.219 HISTORY OF CESAREAN SECTION COMPLICATING PREGNANCY: Primary | ICD-10-CM

## 2021-09-07 PROCEDURE — 0502F SUBSEQUENT PRENATAL CARE: CPT | Mod: CPTII,S$GLB,, | Performed by: ADVANCED PRACTICE MIDWIFE

## 2021-09-07 PROCEDURE — 99999 PR PBB SHADOW E&M-EST. PATIENT-LVL III: CPT | Mod: PBBFAC,,, | Performed by: ADVANCED PRACTICE MIDWIFE

## 2021-09-07 PROCEDURE — 99999 PR PBB SHADOW E&M-EST. PATIENT-LVL III: ICD-10-PCS | Mod: PBBFAC,,, | Performed by: ADVANCED PRACTICE MIDWIFE

## 2021-09-07 PROCEDURE — 0502F PR SUBSEQUENT PRENATAL CARE: ICD-10-PCS | Mod: CPTII,S$GLB,, | Performed by: ADVANCED PRACTICE MIDWIFE

## 2021-09-07 RX ORDER — ASCORBIC ACID 500 MG
500 TABLET ORAL DAILY
COMMUNITY

## 2021-09-11 ENCOUNTER — PATIENT MESSAGE (OUTPATIENT)
Dept: ADMINISTRATIVE | Facility: OTHER | Age: 36
End: 2021-09-11

## 2021-09-13 ENCOUNTER — PATIENT MESSAGE (OUTPATIENT)
Dept: OBSTETRICS AND GYNECOLOGY | Facility: CLINIC | Age: 36
End: 2021-09-13

## 2021-10-07 ENCOUNTER — ROUTINE PRENATAL (OUTPATIENT)
Dept: OBSTETRICS AND GYNECOLOGY | Facility: CLINIC | Age: 36
End: 2021-10-07
Payer: COMMERCIAL

## 2021-10-07 VITALS
SYSTOLIC BLOOD PRESSURE: 118 MMHG | BODY MASS INDEX: 30.03 KG/M2 | DIASTOLIC BLOOD PRESSURE: 60 MMHG | WEIGHT: 169.56 LBS

## 2021-10-07 DIAGNOSIS — Z36.89 ENCOUNTER FOR ULTRASOUND TO ASSESS FETAL GROWTH: Primary | ICD-10-CM

## 2021-10-07 PROCEDURE — 90471 FLU VACCINE (QUAD) GREATER THAN OR EQUAL TO 3YO PRESERVATIVE FREE IM: ICD-10-PCS | Mod: S$GLB,,, | Performed by: OBSTETRICS & GYNECOLOGY

## 2021-10-07 PROCEDURE — 0502F PR SUBSEQUENT PRENATAL CARE: ICD-10-PCS | Mod: CPTII,S$GLB,, | Performed by: ADVANCED PRACTICE MIDWIFE

## 2021-10-07 PROCEDURE — 90471 IMMUNIZATION ADMIN: CPT | Mod: S$GLB,,, | Performed by: OBSTETRICS & GYNECOLOGY

## 2021-10-07 PROCEDURE — 0502F SUBSEQUENT PRENATAL CARE: CPT | Mod: CPTII,S$GLB,, | Performed by: ADVANCED PRACTICE MIDWIFE

## 2021-10-07 PROCEDURE — 99999 PR PBB SHADOW E&M-EST. PATIENT-LVL III: ICD-10-PCS | Mod: PBBFAC,,, | Performed by: ADVANCED PRACTICE MIDWIFE

## 2021-10-07 PROCEDURE — 99999 PR PBB SHADOW E&M-EST. PATIENT-LVL III: CPT | Mod: PBBFAC,,, | Performed by: ADVANCED PRACTICE MIDWIFE

## 2021-10-07 PROCEDURE — 90686 IIV4 VACC NO PRSV 0.5 ML IM: CPT | Mod: S$GLB,,, | Performed by: OBSTETRICS & GYNECOLOGY

## 2021-10-07 PROCEDURE — 90686 FLU VACCINE (QUAD) GREATER THAN OR EQUAL TO 3YO PRESERVATIVE FREE IM: ICD-10-PCS | Mod: S$GLB,,, | Performed by: OBSTETRICS & GYNECOLOGY

## 2021-10-25 ENCOUNTER — TELEPHONE (OUTPATIENT)
Dept: OBSTETRICS AND GYNECOLOGY | Facility: CLINIC | Age: 36
End: 2021-10-25
Payer: COMMERCIAL

## 2021-10-25 ENCOUNTER — PATIENT MESSAGE (OUTPATIENT)
Dept: OBSTETRICS AND GYNECOLOGY | Facility: CLINIC | Age: 36
End: 2021-10-25
Payer: COMMERCIAL

## 2021-10-29 ENCOUNTER — ROUTINE PRENATAL (OUTPATIENT)
Dept: OBSTETRICS AND GYNECOLOGY | Facility: CLINIC | Age: 36
End: 2021-10-29
Payer: COMMERCIAL

## 2021-10-29 ENCOUNTER — PROCEDURE VISIT (OUTPATIENT)
Dept: OBSTETRICS AND GYNECOLOGY | Facility: CLINIC | Age: 36
End: 2021-10-29
Payer: COMMERCIAL

## 2021-10-29 VITALS
DIASTOLIC BLOOD PRESSURE: 60 MMHG | SYSTOLIC BLOOD PRESSURE: 110 MMHG | BODY MASS INDEX: 30.89 KG/M2 | WEIGHT: 174.38 LBS

## 2021-10-29 DIAGNOSIS — H90.3 USHER'S SYNDROME: ICD-10-CM

## 2021-10-29 DIAGNOSIS — H35.52 USHER'S SYNDROME: ICD-10-CM

## 2021-10-29 DIAGNOSIS — H91.90 DEAFNESS, UNSPECIFIED LATERALITY: ICD-10-CM

## 2021-10-29 DIAGNOSIS — Z36.2 ENCOUNTER FOR FOLLOW-UP ULTRASOUND OF FETAL ANATOMY: Primary | ICD-10-CM

## 2021-10-29 DIAGNOSIS — Z36.89 ENCOUNTER FOR ULTRASOUND TO ASSESS FETAL GROWTH: ICD-10-CM

## 2021-10-29 DIAGNOSIS — Z3A.21 21 WEEKS GESTATION OF PREGNANCY: ICD-10-CM

## 2021-10-29 DIAGNOSIS — Z34.92 NORMAL PREGNANCY IN SECOND TRIMESTER: ICD-10-CM

## 2021-10-29 DIAGNOSIS — O34.219 HISTORY OF CESAREAN SECTION COMPLICATING PREGNANCY: ICD-10-CM

## 2021-10-29 PROCEDURE — 99999 PR PBB SHADOW E&M-EST. PATIENT-LVL III: ICD-10-PCS | Mod: PBBFAC,,, | Performed by: ADVANCED PRACTICE MIDWIFE

## 2021-10-29 PROCEDURE — 76805 OB US >/= 14 WKS SNGL FETUS: CPT | Mod: PBBFAC | Performed by: OBSTETRICS & GYNECOLOGY

## 2021-10-29 PROCEDURE — 99999 PR PBB SHADOW E&M-EST. PATIENT-LVL III: CPT | Mod: PBBFAC,,, | Performed by: ADVANCED PRACTICE MIDWIFE

## 2021-10-29 PROCEDURE — 0502F PR SUBSEQUENT PRENATAL CARE: ICD-10-PCS | Mod: CPTII,S$GLB,, | Performed by: ADVANCED PRACTICE MIDWIFE

## 2021-10-29 PROCEDURE — 0502F SUBSEQUENT PRENATAL CARE: CPT | Mod: CPTII,S$GLB,, | Performed by: ADVANCED PRACTICE MIDWIFE

## 2021-10-29 RX ORDER — AZITHROMYCIN 250 MG/1
250 TABLET, FILM COATED ORAL
COMMUNITY
Start: 2021-10-25 | End: 2021-12-15

## 2021-11-24 ENCOUNTER — PATIENT MESSAGE (OUTPATIENT)
Dept: ADMINISTRATIVE | Facility: OTHER | Age: 36
End: 2021-11-24
Payer: COMMERCIAL

## 2021-12-01 ENCOUNTER — ROUTINE PRENATAL (OUTPATIENT)
Dept: OBSTETRICS AND GYNECOLOGY | Facility: CLINIC | Age: 36
End: 2021-12-01
Payer: COMMERCIAL

## 2021-12-01 ENCOUNTER — PROCEDURE VISIT (OUTPATIENT)
Dept: OBSTETRICS AND GYNECOLOGY | Facility: CLINIC | Age: 36
End: 2021-12-01
Payer: COMMERCIAL

## 2021-12-01 VITALS
SYSTOLIC BLOOD PRESSURE: 100 MMHG | WEIGHT: 178.38 LBS | BODY MASS INDEX: 31.59 KG/M2 | DIASTOLIC BLOOD PRESSURE: 56 MMHG

## 2021-12-01 DIAGNOSIS — Z34.92 NORMAL PREGNANCY IN SECOND TRIMESTER: Primary | ICD-10-CM

## 2021-12-01 DIAGNOSIS — H91.90 DEAFNESS, UNSPECIFIED LATERALITY: ICD-10-CM

## 2021-12-01 DIAGNOSIS — L29.9 ITCHING: ICD-10-CM

## 2021-12-01 DIAGNOSIS — Z36.2 ENCOUNTER FOR FOLLOW-UP ULTRASOUND OF FETAL ANATOMY: ICD-10-CM

## 2021-12-01 DIAGNOSIS — O34.219 HISTORY OF CESAREAN SECTION COMPLICATING PREGNANCY: ICD-10-CM

## 2021-12-01 PROCEDURE — 76816 US OB/GYN PROCEDURE (VIEWPOINT): ICD-10-PCS | Mod: S$GLB,,, | Performed by: OBSTETRICS & GYNECOLOGY

## 2021-12-01 PROCEDURE — 0502F PR SUBSEQUENT PRENATAL CARE: ICD-10-PCS | Mod: CPTII,S$GLB,, | Performed by: ADVANCED PRACTICE MIDWIFE

## 2021-12-01 PROCEDURE — 99999 PR PBB SHADOW E&M-EST. PATIENT-LVL III: CPT | Mod: PBBFAC,,, | Performed by: ADVANCED PRACTICE MIDWIFE

## 2021-12-01 PROCEDURE — 76816 OB US FOLLOW-UP PER FETUS: CPT | Mod: S$GLB,,, | Performed by: OBSTETRICS & GYNECOLOGY

## 2021-12-01 PROCEDURE — 99999 PR PBB SHADOW E&M-EST. PATIENT-LVL III: ICD-10-PCS | Mod: PBBFAC,,, | Performed by: ADVANCED PRACTICE MIDWIFE

## 2021-12-01 PROCEDURE — 0502F SUBSEQUENT PRENATAL CARE: CPT | Mod: CPTII,S$GLB,, | Performed by: ADVANCED PRACTICE MIDWIFE

## 2021-12-01 RX ORDER — HYDROXYZINE PAMOATE 25 MG/1
25 CAPSULE ORAL EVERY 6 HOURS PRN
Qty: 30 CAPSULE | Refills: 1 | Status: SHIPPED | OUTPATIENT
Start: 2021-12-01 | End: 2022-01-12

## 2021-12-15 ENCOUNTER — LAB VISIT (OUTPATIENT)
Dept: LAB | Facility: HOSPITAL | Age: 36
End: 2021-12-15
Payer: COMMERCIAL

## 2021-12-15 ENCOUNTER — ROUTINE PRENATAL (OUTPATIENT)
Dept: OBSTETRICS AND GYNECOLOGY | Facility: CLINIC | Age: 36
End: 2021-12-15
Payer: COMMERCIAL

## 2021-12-15 VITALS
WEIGHT: 179.69 LBS | DIASTOLIC BLOOD PRESSURE: 68 MMHG | SYSTOLIC BLOOD PRESSURE: 118 MMHG | BODY MASS INDEX: 31.83 KG/M2

## 2021-12-15 DIAGNOSIS — Z34.92 NORMAL PREGNANCY IN SECOND TRIMESTER: Primary | ICD-10-CM

## 2021-12-15 DIAGNOSIS — Z98.891 PREVIOUS CESAREAN SECTION: ICD-10-CM

## 2021-12-15 DIAGNOSIS — L29.9 ITCHING: ICD-10-CM

## 2021-12-15 DIAGNOSIS — H35.52 USHER'S SYNDROME: ICD-10-CM

## 2021-12-15 DIAGNOSIS — H90.3 USHER'S SYNDROME: ICD-10-CM

## 2021-12-15 DIAGNOSIS — Z3A.28 28 WEEKS GESTATION OF PREGNANCY: ICD-10-CM

## 2021-12-15 DIAGNOSIS — Z34.92 NORMAL PREGNANCY IN SECOND TRIMESTER: ICD-10-CM

## 2021-12-15 LAB — GLUCOSE SERPL-MCNC: 163 MG/DL (ref 70–140)

## 2021-12-15 PROCEDURE — 87389 HIV-1 AG W/HIV-1&-2 AB AG IA: CPT | Performed by: ADVANCED PRACTICE MIDWIFE

## 2021-12-15 PROCEDURE — 0502F SUBSEQUENT PRENATAL CARE: CPT | Mod: CPTII,S$GLB,, | Performed by: ADVANCED PRACTICE MIDWIFE

## 2021-12-15 PROCEDURE — 36415 COLL VENOUS BLD VENIPUNCTURE: CPT | Performed by: ADVANCED PRACTICE MIDWIFE

## 2021-12-15 PROCEDURE — 82239 BILE ACIDS TOTAL: CPT | Performed by: ADVANCED PRACTICE MIDWIFE

## 2021-12-15 PROCEDURE — 85025 COMPLETE CBC W/AUTO DIFF WBC: CPT | Performed by: ADVANCED PRACTICE MIDWIFE

## 2021-12-15 PROCEDURE — 86592 SYPHILIS TEST NON-TREP QUAL: CPT | Performed by: ADVANCED PRACTICE MIDWIFE

## 2021-12-15 PROCEDURE — 99999 PR PBB SHADOW E&M-EST. PATIENT-LVL III: CPT | Mod: PBBFAC,,, | Performed by: ADVANCED PRACTICE MIDWIFE

## 2021-12-15 PROCEDURE — 0502F PR SUBSEQUENT PRENATAL CARE: ICD-10-PCS | Mod: CPTII,S$GLB,, | Performed by: ADVANCED PRACTICE MIDWIFE

## 2021-12-15 PROCEDURE — 82950 GLUCOSE TEST: CPT | Performed by: ADVANCED PRACTICE MIDWIFE

## 2021-12-15 PROCEDURE — 99999 PR PBB SHADOW E&M-EST. PATIENT-LVL III: ICD-10-PCS | Mod: PBBFAC,,, | Performed by: ADVANCED PRACTICE MIDWIFE

## 2021-12-16 ENCOUNTER — PATIENT MESSAGE (OUTPATIENT)
Dept: OBSTETRICS AND GYNECOLOGY | Facility: CLINIC | Age: 36
End: 2021-12-16
Payer: COMMERCIAL

## 2021-12-16 DIAGNOSIS — O99.810 ABNORMAL GLUCOSE IN PREGNANCY, ANTEPARTUM: Primary | ICD-10-CM

## 2021-12-16 LAB
BASOPHILS # BLD AUTO: 0.01 K/UL (ref 0–0.2)
BASOPHILS NFR BLD: 0.2 % (ref 0–1.9)
DIFFERENTIAL METHOD: ABNORMAL
EOSINOPHIL # BLD AUTO: 0 K/UL (ref 0–0.5)
EOSINOPHIL NFR BLD: 0.5 % (ref 0–8)
ERYTHROCYTE [DISTWIDTH] IN BLOOD BY AUTOMATED COUNT: 13 % (ref 11.5–14.5)
HCT VFR BLD AUTO: 36.4 % (ref 37–48.5)
HGB BLD-MCNC: 11.9 G/DL (ref 12–16)
HIV 1+2 AB+HIV1 P24 AG SERPL QL IA: NEGATIVE
IMM GRANULOCYTES # BLD AUTO: 0.02 K/UL (ref 0–0.04)
IMM GRANULOCYTES NFR BLD AUTO: 0.3 % (ref 0–0.5)
LYMPHOCYTES # BLD AUTO: 1.6 K/UL (ref 1–4.8)
LYMPHOCYTES NFR BLD: 26.6 % (ref 18–48)
MCH RBC QN AUTO: 31.2 PG (ref 27–31)
MCHC RBC AUTO-ENTMCNC: 32.7 G/DL (ref 32–36)
MCV RBC AUTO: 95 FL (ref 82–98)
MONOCYTES # BLD AUTO: 0.2 K/UL (ref 0.3–1)
MONOCYTES NFR BLD: 3.9 % (ref 4–15)
NEUTROPHILS # BLD AUTO: 4 K/UL (ref 1.8–7.7)
NEUTROPHILS NFR BLD: 68.5 % (ref 38–73)
NRBC BLD-RTO: 0 /100 WBC
PLATELET # BLD AUTO: 235 K/UL (ref 150–450)
PMV BLD AUTO: 10.7 FL (ref 9.2–12.9)
RBC # BLD AUTO: 3.82 M/UL (ref 4–5.4)
RPR SER QL: NORMAL
WBC # BLD AUTO: 5.87 K/UL (ref 3.9–12.7)

## 2021-12-17 LAB — BILE AC SERPL-SCNC: 2 MCMOL/L

## 2021-12-20 ENCOUNTER — LAB VISIT (OUTPATIENT)
Dept: LAB | Facility: HOSPITAL | Age: 36
End: 2021-12-20
Attending: ADVANCED PRACTICE MIDWIFE
Payer: COMMERCIAL

## 2021-12-20 DIAGNOSIS — O99.810 ABNORMAL GLUCOSE IN PREGNANCY, ANTEPARTUM: ICD-10-CM

## 2021-12-20 DIAGNOSIS — O99.810 GLUCOSE INTOLERANCE OF PREGNANCY: Primary | ICD-10-CM

## 2021-12-20 DIAGNOSIS — O99.810 GLUCOSE INTOLERANCE OF PREGNANCY: ICD-10-CM

## 2021-12-20 PROCEDURE — 36415 COLL VENOUS BLD VENIPUNCTURE: CPT | Performed by: ADVANCED PRACTICE MIDWIFE

## 2021-12-20 PROCEDURE — 82952 GTT-ADDED SAMPLES: CPT | Performed by: ADVANCED PRACTICE MIDWIFE

## 2021-12-20 PROCEDURE — 82951 GLUCOSE TOLERANCE TEST (GTT): CPT | Performed by: ADVANCED PRACTICE MIDWIFE

## 2021-12-21 LAB
GLUCOSE SERPL-MCNC: 144 MG/DL
GLUCOSE SERPL-MCNC: 152 MG/DL
GLUCOSE SERPL-MCNC: 91 MG/DL (ref 70–110)
GLUCOSE SERPL-MCNC: 99 MG/DL

## 2021-12-28 ENCOUNTER — ROUTINE PRENATAL (OUTPATIENT)
Dept: OBSTETRICS AND GYNECOLOGY | Facility: CLINIC | Age: 36
End: 2021-12-28
Payer: COMMERCIAL

## 2021-12-28 VITALS
DIASTOLIC BLOOD PRESSURE: 80 MMHG | SYSTOLIC BLOOD PRESSURE: 122 MMHG | WEIGHT: 183.19 LBS | BODY MASS INDEX: 32.45 KG/M2

## 2021-12-28 DIAGNOSIS — O34.219 HISTORY OF CESAREAN SECTION COMPLICATING PREGNANCY: Primary | ICD-10-CM

## 2021-12-28 DIAGNOSIS — Z23 NEED FOR TDAP VACCINATION: ICD-10-CM

## 2021-12-28 PROCEDURE — 0502F SUBSEQUENT PRENATAL CARE: CPT | Mod: CPTII,S$GLB,, | Performed by: OBSTETRICS & GYNECOLOGY

## 2021-12-28 PROCEDURE — 99999 PR PBB SHADOW E&M-EST. PATIENT-LVL III: ICD-10-PCS | Mod: PBBFAC,,, | Performed by: OBSTETRICS & GYNECOLOGY

## 2021-12-28 PROCEDURE — 90715 TDAP VACCINE GREATER THAN OR EQUAL TO 7YO IM: ICD-10-PCS | Mod: S$GLB,,, | Performed by: OBSTETRICS & GYNECOLOGY

## 2021-12-28 PROCEDURE — 90471 TDAP VACCINE GREATER THAN OR EQUAL TO 7YO IM: ICD-10-PCS | Mod: S$GLB,,, | Performed by: OBSTETRICS & GYNECOLOGY

## 2021-12-28 PROCEDURE — 90471 IMMUNIZATION ADMIN: CPT | Mod: S$GLB,,, | Performed by: OBSTETRICS & GYNECOLOGY

## 2021-12-28 PROCEDURE — 0502F PR SUBSEQUENT PRENATAL CARE: ICD-10-PCS | Mod: CPTII,S$GLB,, | Performed by: OBSTETRICS & GYNECOLOGY

## 2021-12-28 PROCEDURE — 99999 PR PBB SHADOW E&M-EST. PATIENT-LVL III: CPT | Mod: PBBFAC,,, | Performed by: OBSTETRICS & GYNECOLOGY

## 2021-12-28 PROCEDURE — 90715 TDAP VACCINE 7 YRS/> IM: CPT | Mod: S$GLB,,, | Performed by: OBSTETRICS & GYNECOLOGY

## 2022-01-05 ENCOUNTER — PATIENT MESSAGE (OUTPATIENT)
Dept: OBSTETRICS AND GYNECOLOGY | Facility: CLINIC | Age: 37
End: 2022-01-05
Payer: COMMERCIAL

## 2022-01-12 ENCOUNTER — ROUTINE PRENATAL (OUTPATIENT)
Dept: OBSTETRICS AND GYNECOLOGY | Facility: CLINIC | Age: 37
End: 2022-01-12
Payer: COMMERCIAL

## 2022-01-12 VITALS
SYSTOLIC BLOOD PRESSURE: 118 MMHG | BODY MASS INDEX: 32.96 KG/M2 | DIASTOLIC BLOOD PRESSURE: 70 MMHG | WEIGHT: 186.06 LBS

## 2022-01-12 DIAGNOSIS — O34.219 HISTORY OF CESAREAN SECTION COMPLICATING PREGNANCY: Primary | ICD-10-CM

## 2022-01-12 DIAGNOSIS — Z98.891 PREVIOUS CESAREAN SECTION: ICD-10-CM

## 2022-01-12 DIAGNOSIS — U07.1 LAB TEST POSITIVE FOR DETECTION OF COVID-19 VIRUS: ICD-10-CM

## 2022-01-12 DIAGNOSIS — Z3A.32 32 WEEKS GESTATION OF PREGNANCY: ICD-10-CM

## 2022-01-12 DIAGNOSIS — Z34.93 NORMAL PREGNANCY IN THIRD TRIMESTER: ICD-10-CM

## 2022-01-12 PROCEDURE — 99999 PR PBB SHADOW E&M-EST. PATIENT-LVL III: CPT | Mod: PBBFAC,,, | Performed by: ADVANCED PRACTICE MIDWIFE

## 2022-01-12 PROCEDURE — 0502F SUBSEQUENT PRENATAL CARE: CPT | Mod: CPTII,S$GLB,, | Performed by: ADVANCED PRACTICE MIDWIFE

## 2022-01-12 PROCEDURE — 99999 PR PBB SHADOW E&M-EST. PATIENT-LVL III: ICD-10-PCS | Mod: PBBFAC,,, | Performed by: ADVANCED PRACTICE MIDWIFE

## 2022-01-12 PROCEDURE — 0502F PR SUBSEQUENT PRENATAL CARE: ICD-10-PCS | Mod: CPTII,S$GLB,, | Performed by: ADVANCED PRACTICE MIDWIFE

## 2022-01-12 NOTE — PROGRESS NOTES
Covid positive 1 week ago  Dr Garber 2 weeks to schedule C/S  Reports itching has gotten better  Taking musinex and robitussin for congestion Reports white mucous, aware of need to seek medical attention if breathing becomes worse  Baby active and moving well  Return 2 weeks or PRN

## 2022-01-28 ENCOUNTER — ROUTINE PRENATAL (OUTPATIENT)
Dept: OBSTETRICS AND GYNECOLOGY | Facility: CLINIC | Age: 37
End: 2022-01-28
Payer: COMMERCIAL

## 2022-01-28 VITALS
DIASTOLIC BLOOD PRESSURE: 68 MMHG | WEIGHT: 187.38 LBS | BODY MASS INDEX: 33.19 KG/M2 | SYSTOLIC BLOOD PRESSURE: 108 MMHG

## 2022-01-28 DIAGNOSIS — O34.219 HISTORY OF CESAREAN SECTION COMPLICATING PREGNANCY: Primary | ICD-10-CM

## 2022-01-28 PROCEDURE — 0502F SUBSEQUENT PRENATAL CARE: CPT | Mod: CPTII,S$GLB,, | Performed by: OBSTETRICS & GYNECOLOGY

## 2022-01-28 PROCEDURE — 99999 PR PBB SHADOW E&M-EST. PATIENT-LVL III: CPT | Mod: PBBFAC,,, | Performed by: OBSTETRICS & GYNECOLOGY

## 2022-01-28 PROCEDURE — 0502F PR SUBSEQUENT PRENATAL CARE: ICD-10-PCS | Mod: CPTII,S$GLB,, | Performed by: OBSTETRICS & GYNECOLOGY

## 2022-01-28 PROCEDURE — 99999 PR PBB SHADOW E&M-EST. PATIENT-LVL III: ICD-10-PCS | Mod: PBBFAC,,, | Performed by: OBSTETRICS & GYNECOLOGY

## 2022-01-28 NOTE — PROGRESS NOTES
Pt reports complaints of mild sinus congestion since having COVID 3 weeks ago.  Otherwise doing well.  Would like to schedule C/S.    A/P:  History of  section complicating pregnancy  -     US OB/GYN Procedure ([facility]); Future  -     Pt is due for delivery at 39 WGA.  However, I will be out of town that week and would not be available to perform her C/S until 40 WGA.  Pt was counseled on her options and she is OK with consulting with Dr. Rodriguez to have her C/S at 39 WGA (wed 3/2/22).  Will have pt meet with Dr. Rodriguez at next visit.  -     Labor precautions and kick counts.  -     US for growth, presentation, and BPP (AMA) with next visit.    Follow up in about 1 week (around 2022).

## 2022-02-02 ENCOUNTER — ROUTINE PRENATAL (OUTPATIENT)
Dept: OBSTETRICS AND GYNECOLOGY | Facility: CLINIC | Age: 37
End: 2022-02-02
Payer: COMMERCIAL

## 2022-02-02 ENCOUNTER — PROCEDURE VISIT (OUTPATIENT)
Dept: OBSTETRICS AND GYNECOLOGY | Facility: CLINIC | Age: 37
End: 2022-02-02
Payer: COMMERCIAL

## 2022-02-02 VITALS — DIASTOLIC BLOOD PRESSURE: 70 MMHG | SYSTOLIC BLOOD PRESSURE: 110 MMHG

## 2022-02-02 DIAGNOSIS — O34.219 HISTORY OF CESAREAN SECTION COMPLICATING PREGNANCY: ICD-10-CM

## 2022-02-02 DIAGNOSIS — Z98.891 PREVIOUS CESAREAN SECTION: Primary | ICD-10-CM

## 2022-02-02 DIAGNOSIS — H90.3 USHER'S SYNDROME: ICD-10-CM

## 2022-02-02 DIAGNOSIS — H35.52 USHER'S SYNDROME: ICD-10-CM

## 2022-02-02 DIAGNOSIS — O09.523 MULTIGRAVIDA OF ADVANCED MATERNAL AGE IN THIRD TRIMESTER: ICD-10-CM

## 2022-02-02 PROCEDURE — 76816 OB US FOLLOW-UP PER FETUS: CPT | Mod: S$GLB,,, | Performed by: OBSTETRICS & GYNECOLOGY

## 2022-02-02 PROCEDURE — 99999 PR PBB SHADOW E&M-EST. PATIENT-LVL III: CPT | Mod: PBBFAC,,, | Performed by: OBSTETRICS & GYNECOLOGY

## 2022-02-02 PROCEDURE — 99999 PR PBB SHADOW E&M-EST. PATIENT-LVL III: ICD-10-PCS | Mod: PBBFAC,,, | Performed by: OBSTETRICS & GYNECOLOGY

## 2022-02-02 PROCEDURE — 76816 US OB/GYN PROCEDURE (VIEWPOINT): ICD-10-PCS | Mod: S$GLB,,, | Performed by: OBSTETRICS & GYNECOLOGY

## 2022-02-02 PROCEDURE — 0502F PR SUBSEQUENT PRENATAL CARE: ICD-10-PCS | Mod: CPTII,S$GLB,, | Performed by: OBSTETRICS & GYNECOLOGY

## 2022-02-02 PROCEDURE — 0502F SUBSEQUENT PRENATAL CARE: CPT | Mod: CPTII,S$GLB,, | Performed by: OBSTETRICS & GYNECOLOGY

## 2022-02-02 NOTE — PATIENT INSTRUCTIONS
Patient Education       Tubal Ligation, Laparoscopic Surgery   Why is this procedure done?   A tubal ligation is a long-lasting type of birth control for women. After this surgery, it would be rare for a woman to get pregnant. A woman's eggs are made in her ovaries. Once a month, an egg leaves the ovary and travels down a long thin tube to her uterus or womb. This tube is called the fallopian tube. If a sperm meets the egg, a woman can get pregnant.  During a tubal ligation, the fallopian tubes are cut or tied. Cutting or tying the tubes will stop the sperm from meeting the egg. After this surgery, most women cannot get pregnant.  A tubal ligation is a permanent form of birth control. You should be 100% sure before you have this surgery that you do not want to have any more children. You and your partner should have talked about your decision to not have any more children. A tubal ligation's reversal can occur but is not always successful.  This surgery does not protect you from sexually-transmitted diseases. You will still need to use a condom to protect yourself and your partner against these diseases.     What will the results be?   Pregnancy will be prevented. There may be fewer problems compared to other procedures.  What happens before the procedure?   Your doctor will take your history. Talk to the doctor about:  · All the drugs you are taking. Be sure to include all prescription and over-the-counter (OTC) drugs, and herbal supplements. Tell the doctor about any drug allergy. Bring a list of drugs you take with you.  · Any bleeding problems. Be sure to tell your doctor if you are taking any drugs that may cause bleeding. Some of these are warfarin, rivaroxaban, apixaban, ticagrelor, clopidogrel, ketorolac, ibuprofen, naproxen, or aspirin. Certain vitamins and herbs, such as garlic and fish oil, may also add to the risk for bleeding. You may need to stop these drugs as well. Talk to your doctor about  them.  · If you need to stop eating or drinking before your procedure.  Your doctor will do an exam and may order.  · Lab tests  · Ultrasound  You will not be allowed to drive right away after the procedure. Ask a family member or a friend to drive you home.  You and your doctor will talk about if your tubes should be cut or tied. You will also decide if you want to be asleep or awake for your procedure.  What happens during the procedure?   · Once you are in the operating room, the staff will put an IV in your arm to give you fluids and drugs. You will be given a drug to make you sleepy. The drug will also help you stay pain free during the surgery.  · Your doctor will make 3 to 4 small cuts in your belly. A scope with a tiny camera is put through one of the small cuts to look at your fallopian tubes. Your doctor will put small surgical tools into the cuts to do the surgery. To be able to view the site, gas will be put in your abdomen. Then, the tubes will be tied or cut. Your doctor will close your cuts and cover them with clean bandages.  · The procedure takes 20 minutes or more.  What happens after the procedure?   · You will go to the Recovery Room and the staff will watch you closely. You will be given drugs to keep you comfortable.  · Most often you are able to go home the same day of your procedure.  What lifestyle changes are needed?   · Try to walk each day. Start by walking a little more than you did the day before. Walking boosts blood flow and helps prevent lung, belly, and blood problems.  · You will continue to have periods as you did before.  · Talk with your doctor about safe sex as you can still be exposed to sexually transmitted diseases.  What drugs may be needed?   The doctor may order drugs to:  · Help with pain  · Prevent infection  · Help with passing bowel movements  What problems could happen?   · Infection  · Wound opening  · Heavy blood loss  · Blood clots in your legs or lungs  · Damage  to your bowel, bladder, and other organs inside the belly  · Tubes don't close all the way and you could become pregnant  · Trouble passing bowel movements  Where can I learn more?   American College of Obstetricians and Gynecologists  https://www.acog.org/Patients/FAQs/Sterilization-by-Laparoscopy   Better Health Channel  https://www.betterhealth.[place].gov.[place]/health/HealthyLiving/contraception-female-sterilisation   Office on Womens Health  https://www.[facility] Hospit[facility].gov/a-z-topics/birth-control-methods   Last Reviewed Date   2019-10-18  Consumer Information Use and Disclaimer   This information is not specific medical advice and does not replace information you receive from your health care provider. This is only a brief summary of general information. It does NOT include all information about conditions, illnesses, injuries, tests, procedures, treatments, therapies, discharge instructions or life-style choices that may apply to you. You must talk with your health care provider for complete information about your health and treatment options. This information should not be used to decide whether or not to accept your health care providers advice, instructions or recommendations. Only your health care provider has the knowledge and training to provide advice that is right for you.  Copyright   Copyright © 2021 UpToDate, Inc. and its affiliates and/or licensors. All rights reserved.  Patient Education       Tubal Ligation, Laparoscopic Surgery Discharge Instructions   About this topic   A tubal ligation is a long-lasting type of birth control for women. After this surgery, it would be rare for a woman to get pregnant. A woman's eggs are made in her ovaries. Once a month, an egg leaves the ovary and travels down a long thin tube to her uterus or womb. This tube is called the fallopian tube. If a sperm meets the egg, a woman can get pregnant.  During a tubal ligation, the fallopian tubes are cut or tied. Cutting or  tying the tubes will stop the sperm from meeting the egg. After this surgery, most women cannot get pregnant.  A tubal ligation is a permanent form of birth control. You should be 100% sure before you have this surgery that you do not want to have any more children. You and your partner should have talked about your decision to not have any more children. A tubal ligation's reversal can occur but is not always successful.  This surgery does not protect you from sexually-transmitted diseases. You will still need to use a condom to protect yourself and your partner against these diseases.       What care is needed at home?   · Ask your doctor what you need to do when you go home. Make sure you ask questions if you do not understand what you need to do.  · Talk to your doctor about how to care for your cut sites. Ask your doctor about:  ? When you should change your bandages  ? When you may take a bath or shower  ? If you need to be careful with lifting things over 10 pounds (4.5 kg)  ? When you can go back to your normal activities like work, driving, and sex  · Be sure to wash your hands before and after touching your wound or dressing.  · You can expect some bleeding from your vagina for a few weeks. You may use sanitary pads but not tampons.  · Your bowel movements may take some time to get back to normal. Eat small meals high in fiber to avoid hard stools. Drink 6 to 8 glasses of water each day.  · Try to walk each day. Start by walking a little more than you did the day before. Walking boosts blood flow and helps prevent lung, belly, and blood problems.  · You will continue to have periods as you did before.  · Talk with your doctor about safe sex as you can still be exposed to sexually transmitted diseases.  What follow-up care is needed?   · Your doctor may ask you to make visits to the office to check on your progress. Be sure to keep your visits.   · You may have stitches or staples. If so, your doctor will  often want to remove the stitches or staples in 1 to 2 weeks.  What drugs may be needed?   The doctor may order drugs to:  · Help with pain  · Prevent infection  · Help with passing bowel movements  Will physical activity be limited?   Talk with your doctor about the right amount of activity for you.  What problems could happen?   · Infection  · Wound opening  · Heavy blood loss  · Blood clots in your legs or lungs  · Damage to your bowel, bladder, and other organs inside the belly  · Tubes don't close all the way and you could become pregnant  · Trouble passing bowel movements  When do I need to call the doctor?   · Signs of infection such as a fever of 100.4°F (38°C) or higher, chills, pain with passing urine.  · Signs of wound infection such as swelling, redness, warmth around the wound; too much pain when touched; yellowish, greenish, or bloody discharge; foul smell coming from the cut site; cut site opens up.  · Excessive blood in your sanitary pads or more than six soaked pads in a day  · Smelly green or dark yellow vaginal discharge  · Upset stomach, throwing up, or very bad belly pain  · Pain not helped by drugs you are taking  · No bowel movement after 3 days  · If you feel the need to pass urine but urine will not come out even after 6 hours  · Swelling in your leg or arm that is much greater on one side than the other  Teach Back: Helping You Understand   The Teach Back Method helps you understand the information we are giving you. After you talk with the staff, tell them in your own words what you learned. This helps to make sure the staff has described each thing clearly. It also helps to explain things that may have been confusing. Before going home, make sure you can do these:  · I can tell you about my procedure.  · I can tell you how to care for my cut site.  · I can tell you what I will do if I have a fever, chills, bad smelling drainage from the vagina, or bad belly pain.  Where can I learn more?    American College of Obstetricians and Gynecologists  https://www.acog.org/Patients/FAQs/Sterilization-by-Laparoscopy   Better Health Channel  https://www.betterhealth.[place].gov.[place]/health/HealthyLiving/contraception-female-sterilisation   Office on Womens Health  https://www.womenshealth.gov/a-z-topics/birth-control-methods   Last Reviewed Date   2019-10-18  Consumer Information Use and Disclaimer   This information is not specific medical advice and does not replace information you receive from your health care provider. This is only a brief summary of general information. It does NOT include all information about conditions, illnesses, injuries, tests, procedures, treatments, therapies, discharge instructions or life-style choices that may apply to you. You must talk with your health care provider for complete information about your health and treatment options. This information should not be used to decide whether or not to accept your health care providers advice, instructions or recommendations. Only your health care provider has the knowledge and training to provide advice that is right for you.  Copyright   Copyright © 2021 UpToDate, Inc. and its affiliates and/or licensors. All rights reserved.

## 2022-02-02 NOTE — PROGRESS NOTES
Complaints today:  +fetal movement, no srom, no vag bleeding  Denies contractions  C/o increase pelvic pressure      /70   LMP 2021     36 y.o., at 35w0d by Estimated Date of Delivery: 3/9/22  Patient Active Problem List   Diagnosis    Hearing impaired    Usher syndrome    History of  section complicating pregnancy    Abnormal glucose in pregnancy, antepartum     OB History    Para Term  AB Living   2 1 1     1   SAB IAB Ectopic Multiple Live Births         0 1      # Outcome Date GA Lbr Bhupendra/2nd Weight Sex Delivery Anes PTL Lv   2 Current            1 Term 17 39w6d  3.86 kg (8 lb 8.2 oz) F CS-LTranv Spinal N ZAIRE       Dating reviewed    Allergies and problem list reviewed and updated    Medical and surgical history reviewed    Prenatal labs reviewed and updated    Physical Exam:  ABD: soft, gravid, nontender,     Assessment:  Encounter Diagnoses   Name Primary?    Usher's syndrome     Previous  section Yes    Multigravida of advanced maternal age in third trimester          Plan:   C/section consents signed and witnessed  Case request submitted for 3/2/22  sono--vertex, rodney wnl, 2494gm, 26%;   gbs next visit  Follow up 1 wk; labor precautions reviewed  Aware when to come to hospital  Consider weekly bpp for ama  Has breast pump at home  Pedi--dr soria  Considering salpingectomy for contraception --will discuss with   Procedure reviewed

## 2022-02-04 ENCOUNTER — TELEPHONE (OUTPATIENT)
Dept: OBSTETRICS AND GYNECOLOGY | Facility: CLINIC | Age: 37
End: 2022-02-04
Payer: COMMERCIAL

## 2022-02-04 ENCOUNTER — PATIENT MESSAGE (OUTPATIENT)
Dept: OBSTETRICS AND GYNECOLOGY | Facility: CLINIC | Age: 37
End: 2022-02-04
Payer: COMMERCIAL

## 2022-02-04 DIAGNOSIS — Z98.891 PREVIOUS CESAREAN SECTION: Primary | ICD-10-CM

## 2022-02-11 ENCOUNTER — ROUTINE PRENATAL (OUTPATIENT)
Dept: OBSTETRICS AND GYNECOLOGY | Facility: CLINIC | Age: 37
End: 2022-02-11
Payer: COMMERCIAL

## 2022-02-11 VITALS
DIASTOLIC BLOOD PRESSURE: 70 MMHG | WEIGHT: 190.06 LBS | SYSTOLIC BLOOD PRESSURE: 130 MMHG | BODY MASS INDEX: 33.66 KG/M2

## 2022-02-11 DIAGNOSIS — O09.523 MULTIGRAVIDA OF ADVANCED MATERNAL AGE IN THIRD TRIMESTER: Primary | ICD-10-CM

## 2022-02-11 PROCEDURE — 0502F PR SUBSEQUENT PRENATAL CARE: ICD-10-PCS | Mod: CPTII,S$GLB,, | Performed by: OBSTETRICS & GYNECOLOGY

## 2022-02-11 PROCEDURE — 99999 PR PBB SHADOW E&M-EST. PATIENT-LVL III: ICD-10-PCS | Mod: PBBFAC,,, | Performed by: OBSTETRICS & GYNECOLOGY

## 2022-02-11 PROCEDURE — 87081 CULTURE SCREEN ONLY: CPT | Performed by: OBSTETRICS & GYNECOLOGY

## 2022-02-11 PROCEDURE — 0502F SUBSEQUENT PRENATAL CARE: CPT | Mod: CPTII,S$GLB,, | Performed by: OBSTETRICS & GYNECOLOGY

## 2022-02-11 PROCEDURE — 99999 PR PBB SHADOW E&M-EST. PATIENT-LVL III: CPT | Mod: PBBFAC,,, | Performed by: OBSTETRICS & GYNECOLOGY

## 2022-02-11 NOTE — PROGRESS NOTES
Pt reports complaints of general pelvic pressure, but is otherwise doing well.  Met with Dr. Rodriguez last week.      A/P:  Multigravida of advanced maternal age in third trimester  -     Strep B Screen, Vaginal / Rectal  -     US OB/GYN Procedure ([facility]); Future  -     Doing well.  Labor precautions and kick counts.  -     US for BPP with next visit (AMA).      Follow up in about 1 week (around 2/18/2022).

## 2022-02-14 LAB — BACTERIA SPEC AEROBE CULT: NORMAL

## 2022-02-18 ENCOUNTER — ROUTINE PRENATAL (OUTPATIENT)
Dept: OBSTETRICS AND GYNECOLOGY | Facility: CLINIC | Age: 37
End: 2022-02-18
Payer: COMMERCIAL

## 2022-02-18 ENCOUNTER — PROCEDURE VISIT (OUTPATIENT)
Dept: OBSTETRICS AND GYNECOLOGY | Facility: CLINIC | Age: 37
End: 2022-02-18
Payer: COMMERCIAL

## 2022-02-18 VITALS
BODY MASS INDEX: 33.62 KG/M2 | WEIGHT: 189.81 LBS | DIASTOLIC BLOOD PRESSURE: 66 MMHG | SYSTOLIC BLOOD PRESSURE: 108 MMHG

## 2022-02-18 DIAGNOSIS — O09.523 MULTIGRAVIDA OF ADVANCED MATERNAL AGE IN THIRD TRIMESTER: ICD-10-CM

## 2022-02-18 DIAGNOSIS — O09.523 MULTIGRAVIDA OF ADVANCED MATERNAL AGE IN THIRD TRIMESTER: Primary | ICD-10-CM

## 2022-02-18 DIAGNOSIS — O34.219 HISTORY OF CESAREAN SECTION COMPLICATING PREGNANCY: ICD-10-CM

## 2022-02-18 PROCEDURE — 0502F PR SUBSEQUENT PRENATAL CARE: ICD-10-PCS | Mod: CPTII,S$GLB,, | Performed by: OBSTETRICS & GYNECOLOGY

## 2022-02-18 PROCEDURE — 76819 FETAL BIOPHYS PROFIL W/O NST: CPT | Mod: S$GLB,,, | Performed by: OBSTETRICS & GYNECOLOGY

## 2022-02-18 PROCEDURE — 99999 PR PBB SHADOW E&M-EST. PATIENT-LVL III: CPT | Mod: PBBFAC,,, | Performed by: OBSTETRICS & GYNECOLOGY

## 2022-02-18 PROCEDURE — 99999 PR PBB SHADOW E&M-EST. PATIENT-LVL III: ICD-10-PCS | Mod: PBBFAC,,, | Performed by: OBSTETRICS & GYNECOLOGY

## 2022-02-18 PROCEDURE — 76819 US OB/GYN PROCEDURE (VIEWPOINT): ICD-10-PCS | Mod: S$GLB,,, | Performed by: OBSTETRICS & GYNECOLOGY

## 2022-02-18 PROCEDURE — 0502F SUBSEQUENT PRENATAL CARE: CPT | Mod: CPTII,S$GLB,, | Performed by: OBSTETRICS & GYNECOLOGY

## 2022-02-18 NOTE — PROGRESS NOTES
US today: BPP 8/8, MVP 5.4, cephalic  Pt complains of pruritis behind both ankles without any rashes.  Otherwise doing well.    A/P:  Multigravida of advanced maternal age in third trimester  -     GBS negative.  -     Labor precautions and kick counts.  -     Continue with weekly BPP.    History of  section complicating pregnancy  -     Has C/S scheduled with Dr. Rodriguez on 3/2.      Follow up in about 1 week (around 2022).

## 2022-02-25 ENCOUNTER — ROUTINE PRENATAL (OUTPATIENT)
Dept: OBSTETRICS AND GYNECOLOGY | Facility: CLINIC | Age: 37
End: 2022-02-25
Payer: COMMERCIAL

## 2022-02-25 ENCOUNTER — PROCEDURE VISIT (OUTPATIENT)
Dept: OBSTETRICS AND GYNECOLOGY | Facility: CLINIC | Age: 37
End: 2022-02-25
Payer: COMMERCIAL

## 2022-02-25 VITALS
BODY MASS INDEX: 33.59 KG/M2 | WEIGHT: 189.63 LBS | SYSTOLIC BLOOD PRESSURE: 128 MMHG | DIASTOLIC BLOOD PRESSURE: 70 MMHG

## 2022-02-25 DIAGNOSIS — O34.219 HISTORY OF CESAREAN SECTION COMPLICATING PREGNANCY: Primary | ICD-10-CM

## 2022-02-25 DIAGNOSIS — O34.219 HISTORY OF CESAREAN DELIVERY, CURRENTLY PREGNANT: ICD-10-CM

## 2022-02-25 DIAGNOSIS — O09.523 MULTIGRAVIDA OF ADVANCED MATERNAL AGE IN THIRD TRIMESTER: ICD-10-CM

## 2022-02-25 DIAGNOSIS — H35.52 USHER SYNDROME: ICD-10-CM

## 2022-02-25 DIAGNOSIS — H90.3 USHER SYNDROME: ICD-10-CM

## 2022-02-25 PROCEDURE — 76816 US OB/GYN PROCEDURE (VIEWPOINT): ICD-10-PCS | Mod: S$GLB,,, | Performed by: OBSTETRICS & GYNECOLOGY

## 2022-02-25 PROCEDURE — 0502F PR SUBSEQUENT PRENATAL CARE: ICD-10-PCS | Mod: CPTII,S$GLB,, | Performed by: OBSTETRICS & GYNECOLOGY

## 2022-02-25 PROCEDURE — 99999 PR PBB SHADOW E&M-EST. PATIENT-LVL III: CPT | Mod: PBBFAC,,, | Performed by: OBSTETRICS & GYNECOLOGY

## 2022-02-25 PROCEDURE — 76816 OB US FOLLOW-UP PER FETUS: CPT | Mod: S$GLB,,, | Performed by: OBSTETRICS & GYNECOLOGY

## 2022-02-25 PROCEDURE — 76819 US OB/GYN PROCEDURE (VIEWPOINT): ICD-10-PCS | Mod: S$GLB,,, | Performed by: OBSTETRICS & GYNECOLOGY

## 2022-02-25 PROCEDURE — 0502F SUBSEQUENT PRENATAL CARE: CPT | Mod: CPTII,S$GLB,, | Performed by: OBSTETRICS & GYNECOLOGY

## 2022-02-25 PROCEDURE — 76819 FETAL BIOPHYS PROFIL W/O NST: CPT | Mod: S$GLB,,, | Performed by: OBSTETRICS & GYNECOLOGY

## 2022-02-25 PROCEDURE — 99999 PR PBB SHADOW E&M-EST. PATIENT-LVL III: ICD-10-PCS | Mod: PBBFAC,,, | Performed by: OBSTETRICS & GYNECOLOGY

## 2022-02-25 RX ORDER — CARBOPROST TROMETHAMINE 250 UG/ML
250 INJECTION, SOLUTION INTRAMUSCULAR
Status: CANCELLED | OUTPATIENT
Start: 2022-02-25

## 2022-02-25 RX ORDER — SODIUM CHLORIDE, SODIUM LACTATE, POTASSIUM CHLORIDE, CALCIUM CHLORIDE 600; 310; 30; 20 MG/100ML; MG/100ML; MG/100ML; MG/100ML
INJECTION, SOLUTION INTRAVENOUS CONTINUOUS
Status: CANCELLED | OUTPATIENT
Start: 2022-02-25

## 2022-02-25 RX ORDER — FAMOTIDINE 10 MG/ML
20 INJECTION INTRAVENOUS
Status: CANCELLED | OUTPATIENT
Start: 2022-02-25

## 2022-02-25 RX ORDER — OXYTOCIN/RINGER'S LACTATE 30/500 ML
95 PLASTIC BAG, INJECTION (ML) INTRAVENOUS ONCE
Status: CANCELLED | OUTPATIENT
Start: 2022-02-25 | End: 2022-02-25

## 2022-02-25 RX ORDER — MISOPROSTOL 100 UG/1
800 TABLET ORAL
Status: CANCELLED | OUTPATIENT
Start: 2022-02-25

## 2022-02-25 RX ORDER — MUPIROCIN 20 MG/G
OINTMENT TOPICAL
Status: CANCELLED | OUTPATIENT
Start: 2022-02-25

## 2022-02-25 RX ORDER — SODIUM CITRATE AND CITRIC ACID MONOHYDRATE 334; 500 MG/5ML; MG/5ML
30 SOLUTION ORAL
Status: CANCELLED | OUTPATIENT
Start: 2022-02-25

## 2022-02-25 RX ORDER — OXYTOCIN/RINGER'S LACTATE 30/500 ML
334 PLASTIC BAG, INJECTION (ML) INTRAVENOUS ONCE
Status: CANCELLED | OUTPATIENT
Start: 2022-02-25 | End: 2022-02-25

## 2022-02-25 RX ORDER — METHYLERGONOVINE MALEATE 0.2 MG/ML
200 INJECTION INTRAVENOUS
Status: CANCELLED | OUTPATIENT
Start: 2022-02-25

## 2022-02-25 NOTE — PROGRESS NOTES
US today: 3315 g (51%), cephalic, BPP 8/8, MVP 5.2  Pt reports no complaints.  Is going well and is ready for delivery next week.     A/P:  History of  delivery, currently pregnant  -     Labor precautions and kick counts.  -     Keep scheduled delivery date with Dr. Rodriguez on 3/2.  Pre-operative instructions given.    Multigravida of advanced maternal age in third trimester  -     Reassuring fetal status today.    Usher syndrome

## 2022-03-01 ENCOUNTER — PATIENT MESSAGE (OUTPATIENT)
Dept: PREADMISSION TESTING | Facility: HOSPITAL | Age: 37
End: 2022-03-01
Payer: COMMERCIAL

## 2022-03-01 ENCOUNTER — ANESTHESIA EVENT (OUTPATIENT)
Dept: OBSTETRICS AND GYNECOLOGY | Facility: HOSPITAL | Age: 37
End: 2022-03-01
Payer: COMMERCIAL

## 2022-03-01 NOTE — PRE ADMISSION SCREENING
Pre op instructions reviewed with patient per phone:    To confirm, you surgeon has scheduled you for a .  Surgery is scheduled 3/2/22 . Your doctor will instruct on the time of  your surgery.    Please report to [facility] 4th floor at time instructed by your doctor.      IMPORTANT INSTRUCTIONS!!  Do not eat anything 8 hours prior to surgery.    You may have water and clear juice 3 hours prior to surgery.    OK to brush teeth, no gum, candy or mints!    Do not shave pubic hair 7 days prior to surgery  Do not shave legs 3 days prior to surgery    SHOWERING INSTRUCTIONS:   The night before and morning prior to coming to the hospital:    In the shower, it is best practice to wash and rinse your hair with shampoo, rinse completely     Wet entire body and wash with anti-bacterial soap, rinse completely    With your hand, apply one packet of Hibiclens soap to abdomen from under breasts to above pubic bone, gently scrubbing for 5 minutes.  Do not scrub your skin too hard  Avoid getting Hibiclens on your head, face, or genitals (private parts)    Once you have completed the wash, rinse the Hibiclens thoroughly.      Do not wash with regular soap or anti-bacterial soap after using the Hibiclens    Pat yourself dry using clean dry towel.  DO NOT apply any lotions or powder to abdomen.    Put on clean clothes    It is also recommended best practice to remove all artificial nails/polish prior to surgery

## 2022-03-02 ENCOUNTER — PATIENT MESSAGE (OUTPATIENT)
Dept: OBSTETRICS AND GYNECOLOGY | Facility: CLINIC | Age: 37
End: 2022-03-02

## 2022-03-02 ENCOUNTER — ANESTHESIA (OUTPATIENT)
Dept: OBSTETRICS AND GYNECOLOGY | Facility: HOSPITAL | Age: 37
End: 2022-03-02
Payer: COMMERCIAL

## 2022-03-02 ENCOUNTER — HOSPITAL ENCOUNTER (INPATIENT)
Facility: HOSPITAL | Age: 37
LOS: 2 days | Discharge: HOME | End: 2022-03-04
Attending: OBSTETRICS & GYNECOLOGY | Admitting: OBSTETRICS & GYNECOLOGY
Payer: COMMERCIAL

## 2022-03-02 DIAGNOSIS — O34.219 HISTORY OF CESAREAN DELIVERY, CURRENTLY PREGNANT: ICD-10-CM

## 2022-03-02 DIAGNOSIS — O34.219 HISTORY OF CESAREAN SECTION COMPLICATING PREGNANCY: ICD-10-CM

## 2022-03-02 DIAGNOSIS — Z98.891 S/P CESAREAN SECTION: ICD-10-CM

## 2022-03-02 LAB
ABO + RH BLD: NORMAL
BASOPHILS # BLD AUTO: 0.02 K/UL (ref 0–0.2)
BASOPHILS NFR BLD: 0.3 % (ref 0–1.9)
BLD GP AB SCN CELLS X3 SERPL QL: NORMAL
DIFFERENTIAL METHOD: ABNORMAL
EOSINOPHIL # BLD AUTO: 0.1 K/UL (ref 0–0.5)
EOSINOPHIL NFR BLD: 1.6 % (ref 0–8)
ERYTHROCYTE [DISTWIDTH] IN BLOOD BY AUTOMATED COUNT: 13.5 % (ref 11.5–14.5)
HCT VFR BLD AUTO: 34.7 % (ref 37–48.5)
HGB BLD-MCNC: 11.8 G/DL (ref 12–16)
IMM GRANULOCYTES # BLD AUTO: 0.05 K/UL (ref 0–0.04)
IMM GRANULOCYTES NFR BLD AUTO: 0.7 % (ref 0–0.5)
LYMPHOCYTES # BLD AUTO: 2.1 K/UL (ref 1–4.8)
LYMPHOCYTES NFR BLD: 30.4 % (ref 18–48)
MCH RBC QN AUTO: 30.3 PG (ref 27–31)
MCHC RBC AUTO-ENTMCNC: 34 G/DL (ref 32–36)
MCV RBC AUTO: 89 FL (ref 82–98)
MONOCYTES # BLD AUTO: 0.6 K/UL (ref 0.3–1)
MONOCYTES NFR BLD: 8.8 % (ref 4–15)
NEUTROPHILS # BLD AUTO: 4 K/UL (ref 1.8–7.7)
NEUTROPHILS NFR BLD: 58.2 % (ref 38–73)
NRBC BLD-RTO: 0 /100 WBC
PLATELET # BLD AUTO: 193 K/UL (ref 150–450)
PMV BLD AUTO: 10.7 FL (ref 9.2–12.9)
RBC # BLD AUTO: 3.9 M/UL (ref 4–5.4)
RPR SER QL: NORMAL
WBC # BLD AUTO: 6.91 K/UL (ref 3.9–12.7)

## 2022-03-02 PROCEDURE — 37000008 HC ANESTHESIA 1ST 15 MINUTES: Performed by: OBSTETRICS & GYNECOLOGY

## 2022-03-02 PROCEDURE — 25000003 PHARM REV CODE 250: Performed by: OBSTETRICS & GYNECOLOGY

## 2022-03-02 PROCEDURE — 59510 PR FULL ROUT OBSTE CARE,CESAREAN DELIV: ICD-10-PCS | Mod: GB,,, | Performed by: OBSTETRICS & GYNECOLOGY

## 2022-03-02 PROCEDURE — 71000039 HC RECOVERY, EACH ADD'L HOUR: Performed by: OBSTETRICS & GYNECOLOGY

## 2022-03-02 PROCEDURE — 62322 NJX INTERLAMINAR LMBR/SAC: CPT | Performed by: NURSE ANESTHETIST, CERTIFIED REGISTERED

## 2022-03-02 PROCEDURE — 25000003 PHARM REV CODE 250: Performed by: NURSE ANESTHETIST, CERTIFIED REGISTERED

## 2022-03-02 PROCEDURE — 63600175 PHARM REV CODE 636 W HCPCS: Performed by: NURSE ANESTHETIST, CERTIFIED REGISTERED

## 2022-03-02 PROCEDURE — 51702 INSERT TEMP BLADDER CATH: CPT

## 2022-03-02 PROCEDURE — 63600175 PHARM REV CODE 636 W HCPCS: Performed by: OBSTETRICS & GYNECOLOGY

## 2022-03-02 PROCEDURE — 37000009 HC ANESTHESIA EA ADD 15 MINS: Performed by: OBSTETRICS & GYNECOLOGY

## 2022-03-02 PROCEDURE — 82239 BILE ACIDS TOTAL: CPT | Performed by: OBSTETRICS & GYNECOLOGY

## 2022-03-02 PROCEDURE — 85025 COMPLETE CBC W/AUTO DIFF WBC: CPT | Performed by: OBSTETRICS & GYNECOLOGY

## 2022-03-02 PROCEDURE — 59510 CESAREAN DELIVERY: CPT | Mod: GB,,, | Performed by: OBSTETRICS & GYNECOLOGY

## 2022-03-02 PROCEDURE — 36004725 HC OB OR TIME LEV III - EA ADD 15 MIN: Performed by: OBSTETRICS & GYNECOLOGY

## 2022-03-02 PROCEDURE — 71000033 HC RECOVERY, INTIAL HOUR: Performed by: OBSTETRICS & GYNECOLOGY

## 2022-03-02 PROCEDURE — 36004724 HC OB OR TIME LEV III - 1ST 15 MIN: Performed by: OBSTETRICS & GYNECOLOGY

## 2022-03-02 PROCEDURE — 86592 SYPHILIS TEST NON-TREP QUAL: CPT | Performed by: OBSTETRICS & GYNECOLOGY

## 2022-03-02 PROCEDURE — 11000001 HC ACUTE MED/SURG PRIVATE ROOM

## 2022-03-02 PROCEDURE — 86901 BLOOD TYPING SEROLOGIC RH(D): CPT | Performed by: OBSTETRICS & GYNECOLOGY

## 2022-03-02 RX ORDER — OXYTOCIN/RINGER'S LACTATE 30/500 ML
334 PLASTIC BAG, INJECTION (ML) INTRAVENOUS ONCE
Status: COMPLETED | OUTPATIENT
Start: 2022-03-02 | End: 2022-03-02

## 2022-03-02 RX ORDER — FAMOTIDINE 10 MG/ML
20 INJECTION INTRAVENOUS
Status: DISCONTINUED | OUTPATIENT
Start: 2022-03-02 | End: 2022-03-02

## 2022-03-02 RX ORDER — MISOPROSTOL 200 UG/1
800 TABLET ORAL
Status: DISCONTINUED | OUTPATIENT
Start: 2022-03-02 | End: 2022-03-02

## 2022-03-02 RX ORDER — MORPHINE SULFATE 1 MG/ML
INJECTION, SOLUTION EPIDURAL; INTRATHECAL; INTRAVENOUS
Status: DISCONTINUED | OUTPATIENT
Start: 2022-03-02 | End: 2022-03-02

## 2022-03-02 RX ORDER — OXYTOCIN/RINGER'S LACTATE 30/500 ML
95 PLASTIC BAG, INJECTION (ML) INTRAVENOUS ONCE
Status: DISCONTINUED | OUTPATIENT
Start: 2022-03-02 | End: 2022-03-02

## 2022-03-02 RX ORDER — LANOLIN ALCOHOL/MO/W.PET/CERES
1 CREAM (GRAM) TOPICAL DAILY
Status: DISCONTINUED | OUTPATIENT
Start: 2022-03-02 | End: 2022-03-04 | Stop reason: HOSPADM

## 2022-03-02 RX ORDER — DIPHENHYDRAMINE HCL 25 MG
25 CAPSULE ORAL EVERY 4 HOURS PRN
Status: DISCONTINUED | OUTPATIENT
Start: 2022-03-02 | End: 2022-03-04 | Stop reason: HOSPADM

## 2022-03-02 RX ORDER — FENTANYL CITRATE 50 UG/ML
INJECTION, SOLUTION INTRAMUSCULAR; INTRAVENOUS
Status: DISCONTINUED | OUTPATIENT
Start: 2022-03-02 | End: 2022-03-02

## 2022-03-02 RX ORDER — CEFAZOLIN SODIUM 1 G/3ML
INJECTION, POWDER, FOR SOLUTION INTRAMUSCULAR; INTRAVENOUS
Status: DISCONTINUED | OUTPATIENT
Start: 2022-03-02 | End: 2022-03-02

## 2022-03-02 RX ORDER — SODIUM CHLORIDE 0.9 % (FLUSH) 0.9 %
10 SYRINGE (ML) INJECTION
Status: DISCONTINUED | OUTPATIENT
Start: 2022-03-02 | End: 2022-03-04 | Stop reason: HOSPADM

## 2022-03-02 RX ORDER — DOCUSATE SODIUM 100 MG/1
100 CAPSULE, LIQUID FILLED ORAL 2 TIMES DAILY
Status: DISCONTINUED | OUTPATIENT
Start: 2022-03-02 | End: 2022-03-04 | Stop reason: HOSPADM

## 2022-03-02 RX ORDER — AMOXICILLIN 250 MG
1 CAPSULE ORAL NIGHTLY
Status: DISCONTINUED | OUTPATIENT
Start: 2022-03-02 | End: 2022-03-04 | Stop reason: HOSPADM

## 2022-03-02 RX ORDER — IBUPROFEN 800 MG/1
800 TABLET ORAL EVERY 8 HOURS
Status: DISCONTINUED | OUTPATIENT
Start: 2022-03-03 | End: 2022-03-04 | Stop reason: HOSPADM

## 2022-03-02 RX ORDER — PHENYLEPHRINE HYDROCHLORIDE 10 MG/ML
INJECTION INTRAVENOUS
Status: DISCONTINUED | OUTPATIENT
Start: 2022-03-02 | End: 2022-03-02

## 2022-03-02 RX ORDER — ONDANSETRON 2 MG/ML
INJECTION INTRAMUSCULAR; INTRAVENOUS
Status: DISCONTINUED | OUTPATIENT
Start: 2022-03-02 | End: 2022-03-02

## 2022-03-02 RX ORDER — CHLORHEXIDINE GLUCONATE ORAL RINSE 1.2 MG/ML
10 SOLUTION DENTAL 2 TIMES DAILY
Status: DISCONTINUED | OUTPATIENT
Start: 2022-03-02 | End: 2022-03-04 | Stop reason: HOSPADM

## 2022-03-02 RX ORDER — SIMETHICONE 80 MG
1 TABLET,CHEWABLE ORAL EVERY 6 HOURS PRN
Status: DISCONTINUED | OUTPATIENT
Start: 2022-03-02 | End: 2022-03-04 | Stop reason: HOSPADM

## 2022-03-02 RX ORDER — HYDROCORTISONE 25 MG/G
CREAM TOPICAL 3 TIMES DAILY PRN
Status: DISCONTINUED | OUTPATIENT
Start: 2022-03-02 | End: 2022-03-04 | Stop reason: HOSPADM

## 2022-03-02 RX ORDER — METHYLERGONOVINE MALEATE 0.2 MG/ML
200 INJECTION INTRAVENOUS
Status: DISCONTINUED | OUTPATIENT
Start: 2022-03-02 | End: 2022-03-02

## 2022-03-02 RX ORDER — ONDANSETRON 8 MG/1
8 TABLET, ORALLY DISINTEGRATING ORAL EVERY 8 HOURS PRN
Status: DISCONTINUED | OUTPATIENT
Start: 2022-03-02 | End: 2022-03-04 | Stop reason: HOSPADM

## 2022-03-02 RX ORDER — HYDROMORPHONE HYDROCHLORIDE 1 MG/ML
1 INJECTION, SOLUTION INTRAMUSCULAR; INTRAVENOUS; SUBCUTANEOUS EVERY 4 HOURS PRN
Status: DISCONTINUED | OUTPATIENT
Start: 2022-03-02 | End: 2022-03-04 | Stop reason: HOSPADM

## 2022-03-02 RX ORDER — SODIUM CITRATE AND CITRIC ACID MONOHYDRATE 334; 500 MG/5ML; MG/5ML
30 SOLUTION ORAL
Status: DISCONTINUED | OUTPATIENT
Start: 2022-03-02 | End: 2022-03-02

## 2022-03-02 RX ORDER — BUPIVACAINE HYDROCHLORIDE 7.5 MG/ML
INJECTION, SOLUTION EPIDURAL; RETROBULBAR
Status: DISCONTINUED | OUTPATIENT
Start: 2022-03-02 | End: 2022-03-02

## 2022-03-02 RX ORDER — CARBOPROST TROMETHAMINE 250 UG/ML
250 INJECTION, SOLUTION INTRAMUSCULAR
Status: DISCONTINUED | OUTPATIENT
Start: 2022-03-02 | End: 2022-03-02

## 2022-03-02 RX ORDER — OXYTOCIN/RINGER'S LACTATE 30/500 ML
95 PLASTIC BAG, INJECTION (ML) INTRAVENOUS ONCE
Status: COMPLETED | OUTPATIENT
Start: 2022-03-02 | End: 2022-03-02

## 2022-03-02 RX ORDER — MUPIROCIN 20 MG/G
OINTMENT TOPICAL
Status: DISCONTINUED | OUTPATIENT
Start: 2022-03-02 | End: 2022-03-02

## 2022-03-02 RX ORDER — HYDROCODONE BITARTRATE AND ACETAMINOPHEN 5; 325 MG/1; MG/1
1 TABLET ORAL EVERY 4 HOURS PRN
Status: DISCONTINUED | OUTPATIENT
Start: 2022-03-02 | End: 2022-03-04 | Stop reason: HOSPADM

## 2022-03-02 RX ORDER — KETOROLAC TROMETHAMINE 30 MG/ML
30 INJECTION, SOLUTION INTRAMUSCULAR; INTRAVENOUS EVERY 8 HOURS
Status: DISPENSED | OUTPATIENT
Start: 2022-03-02 | End: 2022-03-03

## 2022-03-02 RX ORDER — CEFAZOLIN SODIUM 2 G/50ML
2 SOLUTION INTRAVENOUS
Status: DISCONTINUED | OUTPATIENT
Start: 2022-03-02 | End: 2022-03-02

## 2022-03-02 RX ORDER — SODIUM CHLORIDE, SODIUM LACTATE, POTASSIUM CHLORIDE, CALCIUM CHLORIDE 600; 310; 30; 20 MG/100ML; MG/100ML; MG/100ML; MG/100ML
INJECTION, SOLUTION INTRAVENOUS CONTINUOUS
Status: DISCONTINUED | OUTPATIENT
Start: 2022-03-02 | End: 2022-03-02

## 2022-03-02 RX ORDER — HYDROCODONE BITARTRATE AND ACETAMINOPHEN 7.5; 325 MG/1; MG/1
1 TABLET ORAL EVERY 4 HOURS PRN
Status: DISCONTINUED | OUTPATIENT
Start: 2022-03-02 | End: 2022-03-04 | Stop reason: HOSPADM

## 2022-03-02 RX ORDER — PROCHLORPERAZINE EDISYLATE 5 MG/ML
5 INJECTION INTRAMUSCULAR; INTRAVENOUS EVERY 6 HOURS PRN
Status: DISCONTINUED | OUTPATIENT
Start: 2022-03-02 | End: 2022-03-04 | Stop reason: HOSPADM

## 2022-03-02 RX ORDER — KETOROLAC TROMETHAMINE 30 MG/ML
INJECTION, SOLUTION INTRAMUSCULAR; INTRAVENOUS
Status: DISCONTINUED | OUTPATIENT
Start: 2022-03-02 | End: 2022-03-02

## 2022-03-02 RX ORDER — PRENATAL WITH FERROUS FUM AND FOLIC ACID 3080; 920; 120; 400; 22; 1.84; 3; 20; 10; 1; 12; 200; 27; 25; 2 [IU]/1; [IU]/1; MG/1; [IU]/1; MG/1; MG/1; MG/1; MG/1; MG/1; MG/1; UG/1; MG/1; MG/1; MG/1; MG/1
1 TABLET ORAL DAILY
Status: DISCONTINUED | OUTPATIENT
Start: 2022-03-02 | End: 2022-03-04 | Stop reason: HOSPADM

## 2022-03-02 RX ADMIN — DOCUSATE SODIUM 100 MG: 100 CAPSULE, LIQUID FILLED ORAL at 09:03

## 2022-03-02 RX ADMIN — PHENYLEPHRINE HYDROCHLORIDE 100 MCG: 10 INJECTION INTRAVENOUS at 07:03

## 2022-03-02 RX ADMIN — DOCUSATE SODIUM 100 MG: 100 CAPSULE, LIQUID FILLED ORAL at 10:03

## 2022-03-02 RX ADMIN — PROCHLORPERAZINE EDISYLATE 5 MG: 5 INJECTION INTRAMUSCULAR; INTRAVENOUS at 09:03

## 2022-03-02 RX ADMIN — Medication 334 ML/HR: at 07:03

## 2022-03-02 RX ADMIN — CHLORHEXIDINE GLUCONATE 0.12% ORAL RINSE 10 ML: 1.2 LIQUID ORAL at 10:03

## 2022-03-02 RX ADMIN — FENTANYL CITRATE 100 MCG: 50 INJECTION, SOLUTION INTRAMUSCULAR; INTRAVENOUS at 07:03

## 2022-03-02 RX ADMIN — KETOROLAC TROMETHAMINE 30 MG: 30 INJECTION, SOLUTION INTRAMUSCULAR at 09:03

## 2022-03-02 RX ADMIN — MORPHINE SULFATE 0.2 MG: 1 INJECTION, SOLUTION EPIDURAL; INTRATHECAL; INTRAVENOUS at 07:03

## 2022-03-02 RX ADMIN — BUPIVACAINE HYDROCHLORIDE 1.5 ML: 7.5 INJECTION, SOLUTION EPIDURAL; RETROBULBAR at 07:03

## 2022-03-02 RX ADMIN — CEFAZOLIN 2 G: 1 INJECTION, POWDER, FOR SOLUTION INTRAMUSCULAR; INTRAVENOUS at 07:03

## 2022-03-02 RX ADMIN — PRENATAL VITAMINS-IRON FUMARATE 27 MG IRON-FOLIC ACID 0.8 MG TABLET 1 TABLET: at 10:03

## 2022-03-02 RX ADMIN — CHLORHEXIDINE GLUCONATE 0.12% ORAL RINSE 10 ML: 1.2 LIQUID ORAL at 09:03

## 2022-03-02 RX ADMIN — ONDANSETRON 4 MG: 2 INJECTION, SOLUTION INTRAMUSCULAR; INTRAVENOUS at 07:03

## 2022-03-02 RX ADMIN — DOCUSATE SODIUM AND SENNOSIDES 1 TABLET: 8.6; 5 TABLET, FILM COATED ORAL at 09:03

## 2022-03-02 RX ADMIN — Medication 95 MILLI-UNITS/MIN: at 08:03

## 2022-03-02 RX ADMIN — FERROUS SULFATE TAB 325 MG (65 MG ELEMENTAL FE) 1 EACH: 325 (65 FE) TAB at 10:03

## 2022-03-02 RX ADMIN — SODIUM CHLORIDE, SODIUM LACTATE, POTASSIUM CHLORIDE, AND CALCIUM CHLORIDE 1000 ML: .6; .31; .03; .02 INJECTION, SOLUTION INTRAVENOUS at 06:03

## 2022-03-02 RX ADMIN — DIPHENHYDRAMINE HYDROCHLORIDE 25 MG: 25 CAPSULE ORAL at 10:03

## 2022-03-02 RX ADMIN — KETOROLAC TROMETHAMINE 30 MG: 30 INJECTION, SOLUTION INTRAMUSCULAR at 08:03

## 2022-03-02 RX ADMIN — SODIUM CHLORIDE, SODIUM LACTATE, POTASSIUM CHLORIDE, AND CALCIUM CHLORIDE: .6; .31; .03; .02 INJECTION, SOLUTION INTRAVENOUS at 07:03

## 2022-03-02 RX ADMIN — ONDANSETRON 8 MG: 8 TABLET, ORALLY DISINTEGRATING ORAL at 06:03

## 2022-03-02 NOTE — ANESTHESIA POSTPROCEDURE EVALUATION
Anesthesia Post Evaluation    Patient: Nicik Larson    Procedure(s) Performed: Procedure(s) (LRB):   SECTION (N/A)    Final Anesthesia Type: spinal      Patient location during evaluation: labor & delivery  Patient participation: Yes- Able to Participate  Level of consciousness: awake and alert and oriented  Post-procedure vital signs: reviewed and stable  Pain management: adequate  Airway patency: patent    PONV status at discharge: No PONV  Anesthetic complications: no      Cardiovascular status: blood pressure returned to baseline, stable and hemodynamically stable  Respiratory status: unassisted, room air and spontaneous ventilation  Hydration status: euvolemic  Follow-up not needed.          Vitals Value Taken Time   /69 22 1043   Temp 36.4 °C (97.5 °F) 22 0828   Pulse 62 22 1043   Resp 14 22 1048   SpO2 95 % 22 1037   Vitals shown include unvalidated device data.      Event Time   Out of Recovery 10:20:00         Pain/Guru Score: No data recorded

## 2022-03-02 NOTE — ANESTHESIA PREPROCEDURE EVALUATION
03/02/2022  Nicki Larson is a 36 y.o., female.      Pre-op Assessment    I have reviewed the Patient Summary Reports.     I have reviewed the Nursing Notes. I have reviewed the NPO Status.   I have reviewed the Medications.     Review of Systems  Anesthesia Hx:  History of prior surgery of interest to airway management or planning: Denies Family Hx of Anesthesia complications.   Denies Personal Hx of Anesthesia complications.   Social:  Former Smoker    Hematology/Oncology:  Hematology Normal   Oncology Normal     EENT/Dental:   Usher syndrome  Hearing impaired   Cardiovascular:  Cardiovascular Normal     Pulmonary:  Pulmonary Normal    Renal/:  Renal/ Normal     Hepatic/GI:  Hepatic/GI Normal    Musculoskeletal:  Musculoskeletal Normal    Neurological:  Neurology Normal    Endocrine:   Abnormal glucose in pregnancy, antepartum   Dermatological:  Skin Normal    Psych:  Psychiatric Normal           Physical Exam  General: Well nourished, Cooperative, Alert and Oriented    Airway:  Mallampati: I   Mouth Opening: Normal  TM Distance: Normal  Tongue: Normal  Neck ROM: Normal ROM    Dental:  Intact        Anesthesia Plan  Type of Anesthesia, risks & benefits discussed:    Anesthesia Type: Spinal  Intra-op Monitoring Plan: Standard ASA Monitors  Post Op Pain Control Plan: multimodal analgesia and intrathecal opioid  Informed Consent: Informed consent signed with the Patient and all parties understand the risks and agree with anesthesia plan.  All questions answered. Patient consented to blood products? Yes  ASA Score: 2    Ready For Surgery From Anesthesia Perspective.     .

## 2022-03-02 NOTE — PLAN OF CARE
[facility])  Discharge Assessment    Primary Care Provider: Primary Doctor No     OB Screen (most recent)       OB Screen - 22 1523          OB SCREEN    Assessment Type Discharge Planning Assessment (P)      Source of Information health record (P)      Received Prenatal Care Yes (P)      Any indications/suspicions for None (P)      Is this a teen pregnancy No (P)      Is the baby in NICU No (P)      Indication for adoption/[facility] Haven No (P)      Indication for DME/post-acute needs No (P)      HIV (+) No (P)      Any congenital  disorders No (P)      Fetal demise/ death No (P)

## 2022-03-02 NOTE — SUBJECTIVE & OBJECTIVE
Obstetric HPI:  Patient reports None contractions, active fetal movement, No vaginal bleeding , No loss of fluid     This pregnancy has been complicated by previous  section, usher syndrome, uses , covid positive 22    OB History    Para Term  AB Living   2 1 1 0 0 1   SAB IAB Ectopic Multiple Live Births   0 0 0 0 1      # Outcome Date GA Lbr Bhupendra/2nd Weight Sex Delivery Anes PTL Lv   2 Current            1 Term 17 39w6d  3.86 kg (8 lb 8.2 oz) F CS-LTranv Spinal N ZAIRE      Name: SERGIO ROSARIO      Apgar1: 9  Apgar5: 9     Past Medical History:   Diagnosis Date    Hearing impaired     Usher syndrome      Past Surgical History:   Procedure Laterality Date     SECTION      EAR MASTOIDECTOMY W/ COCHLEAR IMPLANT W/ 1999       PTA Medications   Medication Sig    ascorbic acid, vitamin C, (VITAMIN C) 500 MG tablet Take 500 mg by mouth once daily.    DOCOSAHEXANOIC ACID (DHA ALGAL-900 ORAL) Take by mouth.    folic acid (FOLVITE) 800 MCG Tab Take 800 mcg by mouth once daily.    meclizine (ANTIVERT) 25 mg tablet Take 1 tablet (25 mg total) by mouth 3 (three) times daily as needed.    ondansetron (ZOFRAN) 4 MG tablet Take 1 tablet (4 mg total) by mouth daily as needed for Nausea.    scopolamine (TRANSDERM-SCOP) 1.5 mg (1 mg over 3 days) Place 1 patch (1.5 mg total) onto the skin every 72 hours.    vitamin A 8000 UNIT capsule Take 8,000 Units by mouth once daily.    ZINC ORAL Take 1 tablet by mouth.       Review of patient's allergies indicates:  No Known Allergies     Family History       Problem Relation (Age of Onset)    Alzheimer's disease Maternal Grandmother    Cancer Maternal Aunt, Maternal Grandfather, Paternal Grandfather    Cirrhosis Maternal Grandfather    Diabetes Maternal Grandfather, Paternal Grandmother    Heart attacks under age 50 Father    Heart disease Paternal Grandmother    Hypertension Father, Maternal Grandmother, Paternal Grandmother     No Known Problems Sister, Brother          Tobacco Use    Smoking status: Former Smoker     Packs/day: 0.25     Types: Cigarettes     Quit date: 3/2/2017     Years since quittin.0    Smokeless tobacco: Never Used   Substance and Sexual Activity    Alcohol use: Not Currently     Alcohol/week: 0.0 standard drinks     Comment: socially     Drug use: No    Sexual activity: Yes     Partners: Male     Birth control/protection: None     Review of Systems   All other systems reviewed and are negative.   Objective:     Vital Signs (Most Recent):  Temp: 98 °F (36.7 °C) (22 0600) Vital Signs (24h Range):  Temp:  [98 °F (36.7 °C)] 98 °F (36.7 °C)        There is no height or weight on file to calculate BMI.    FHT: 120Cat 1 (reassuring)  TOCO:  rare    Physical Exam:   Constitutional: She is oriented to person, place, and time. She appears well-developed and well-nourished.    HENT:   Head: Normocephalic.    Eyes: Pupils are equal, round, and reactive to light. Conjunctivae are normal.     Cardiovascular:  Normal rate and regular rhythm.             Pulmonary/Chest: Effort normal.        Abdominal: Soft.     Genitourinary:    Genitourinary Comments: gravid             Musculoskeletal: Normal range of motion.       Neurological: She is alert and oriented to person, place, and time. She has normal reflexes.    Skin: Skin is warm and dry.    Psychiatric: She has a normal mood and affect. Her behavior is normal. Judgment and thought content normal.     Cervix deferred    Significant Labs:  Lab Results   Component Value Date    GROUPTRH A POS 2021    HEPBSAG Negative 2021    STREPBCULT No Group B Streptococcus isolated 2022       I have personallly reviewed all pertinent lab results from the last 24 hours.  Recent Lab Results         22  0541        Baso # 0.02       Basophil % 0.3       Differential Method Automated       Eos # 0.1       Eosinophil % 1.6       Gran # (ANC) 4.0       Gran % 58.2        Hematocrit 34.7       Hemoglobin 11.8       Immature Grans (Abs) 0.05  Comment: Mild elevation in immature granulocytes is non specific and   can be seen in a variety of conditions including stress response,   acute inflammation, trauma and pregnancy. Correlation with other   laboratory and clinical findings is essential.         Immature Granulocytes 0.7       Lymph # 2.1       Lymph % 30.4       MCH 30.3       MCHC 34.0       MCV 89       Mono # 0.6       Mono % 8.8       MPV 10.7       nRBC 0       Platelets 193       RBC 3.90       RDW 13.5       WBC 6.91

## 2022-03-02 NOTE — L&D DELIVERY NOTE
[facility]   Section   Operative Note    SUMMARY     Date of Procedure: 3/2/2022     Procedure: Procedure(s) (LRB):   SECTION (N/A)    Surgeon(s) and Role:     * Randi Rodriguez MD - Primary    Assisting Surgeon: Trish Fan MS 3    Pre-Operative Diagnosis: Previous  section [Z98.891]    Post-Operative Diagnosis: Post-Op Diagnosis Codes:     * Previous  section [Z98.891]    Anesthesia: Spinal/Epidural    Technical Procedures Used: repeat low transverse  section           Description of the Findings of the Procedure: vertex, clear fluid; normal adnexa, female    Significant Surgical Tasks Conducted by the Assistant(s), if Applicable: 1st assist    Complications: No    Blood Loss: * No values recorded between 3/2/2022  7:25 AM and 3/2/2022  8:12 AM *     With patient in supine position, the legs are  and Bruce Catheter placed and positioning to supine done.   Abdomen prepped with Chloroprep and 3 minute drying time allowed prior to draping of the abdomen.   Time out taken with OR team members.  Pfannenstiel Incision made through the skin, transverse fascial incision developed, rectus muscles  in the midline and the peritoneum entered.   no adhesions noted.  The lower uterine segment and position of the fetus identified.   Bladder flap taken down through transverse peritoneal incision.    Low Transverse Incision made through well developed lower uterine segment and extended laterally with blunt dissection.   Clear fluid noted.  Infant delivered from vertex presentation.  Cord clamped after one minute and  handed to attending nurse.  Cord blood taken, placenta delivered.  The uterus was exteriorized.  The edges of the uterine incision are grasped with Mcdonald clamps at the angles and the inferior and superior midline edges of the incision.    Closure with running lock 1 monocryl, starting at each angle, tying in the midline.  "  Observation for bleeding with suture of any bleeding along the hysterotomy line.     Right and left adnexa with normal anatomy.     The uterus was then returned to the abdomen, gutters cleared of all clots and debris.  The hysterotomy incisions were examined and noted to be hemostatic.      Closure of the abdomen with 2 0 Vicryl running of the peritoneum    Skin closure with 4 0 monocryl subcuticular.  Wound dressed with aquasel and pressure dressing applied         Specimens:   Specimen (24h ago, onward)            None          Condition: Good    Disposition: PACU - hemodynamically stable.    Attestation: Good         Delivery Information for Girl Nicki Larson    Birth information:  YOB: 2023   Time of birth: 7:31 AM   Sex: female   Head Delivery Date/Time: 3/2/2022  7:31 AM   Delivery type: , Low Transverse   Gestational Age: 39w0d    Delivery Providers    Delivering clinician: Randi Rodriguez MD   Provider Role    Trish Iraheta, MA Surgical Tech    Geovanny Ellis,  Surgical Tech    Sonia Rich,  Surgical Tech    Griselda Power, RN Registered Nurse    Alesha Lee RN Registered Nurse    Willard Roberson, CRNA Nurse Anesthetist            Measurements    Weight: 3360 g  Weight (lbs): 7 lb 6.5 oz  Length: 47.6 cm  Length (in): 18.75"  Head circumference: 34.9 cm  Chest circumference: 33 cm  Abdominal girth: 31.8 cm         Apgars    Living status: Living  Apgars:  1 min.:  5 min.:  10 min.:  15 min.:  20 min.:    Skin color:  1  1       Heart rate:  2  2       Reflex irritability:  2  2       Muscle tone:  2  2       Respiratory effort:  2  2       Total:  9  9       Apgars assigned by: SE LEE RN         Operative Delivery    Forceps attempted?: No  Vacuum extractor attempted?: No         Shoulder Dystocia    Shoulder dystocia present?: No           Presentation    Presentation: Vertex           Interventions/Resuscitation    Method: Bulb Suctioning, Deep Suctioning, Tactile Stimulation   "     Cord    Vessels: 3 vessels  Complications: None  Delayed Cord Clamping?: Yes  Cord Clamped Date/Time: 3/2/2022  7:33 AM  Cord Blood Disposition: Discarded  Gases Sent?: No  Stem Cell Collection (by MD): No       Placenta    Placenta delivery date/time: 3/2/2022 0734  Placenta removal: Spontaneous  Placenta appearance: Intact  Placenta disposition: discarded           Labor Events:       labor: No     Labor Onset Date/Time:         Dilation Complete Date/Time:         Start Pushing Date/Time:         Start Pushing Date/Time:       Rupture Date/Time:            Rupture type:          Fluid Amount:       Fluid Color:       Fluid Odor:       Membrane Status:                steroids: None     Antibiotics given for GBS: No     Induction: none     Indications for induction:        Augmentation:       Indications for augmentation:       Labor complications: None     Additional complications:          Cervical ripening:                     Delivery:      Episiotomy: None     Indication for Episiotomy:       Perineal Lacerations: None Repaired:      Periurethral Laceration:   Repaired:     Labial Laceration:   Repaired:     Sulcus Laceration:   Repaired:     Vaginal Laceration:   Repaired:     Cervical Laceration:   Repaired:     Repair suture: None     Repair # of packets: 0     Last Value - EBL - Nursing (mL):       Sum - EBL - Nursing (mL): 0     Last Value - EBL - Anesthesia (mL):      Calculated QBL (mL):       Vaginal Sweep Performed:       Surgicount Correct:         Other providers:       Anesthesia    Method: Spinal          Details (if applicable):  Trial of Labor No    Categorization: Repeat    Priority: Routine   Indications for :     Incision Type: low transverse     Additional  information:  Forceps:    Vacuum:    Breech:    Observed anomalies    Other (Comments):

## 2022-03-02 NOTE — H&P
[facility]  Obstetrics  History & Physical    Patient Name: Inez Larson  MRN: [id number]  Admission Date: 3/2/2022  Primary Care Provider: Primary Doctor No    Subjective:     Principal Problem:History of  section complicating pregnancy    History of Present Illness:  37 y/o  at 39w for elective repeat c/section      Obstetric HPI:  Patient reports None contractions, active fetal movement, No vaginal bleeding , No loss of fluid     This pregnancy has been complicated by previous  section, usher syndrome, uses , covid positive 22    OB History    Para Term  AB Living   2 1 1 0 0 1   SAB IAB Ectopic Multiple Live Births   0 0 0 0 1      # Outcome Date GA Lbr Bhupendra/2nd Weight Sex Delivery Anes PTL Lv   2 Current            1 Term 17 39w6d  3.86 kg (8 lb 8.2 oz) F CS-LTranv Spinal N ZAIRE      Name: MARLENE, GIRL INEZ      Apgar1: 9  Apgar5: 9     Past Medical History:   Diagnosis Date    Hearing impaired     Usher syndrome      Past Surgical History:   Procedure Laterality Date     SECTION      EAR MASTOIDECTOMY W/ COCHLEAR IMPLANT W/ 1999       PTA Medications   Medication Sig    ascorbic acid, vitamin C, (VITAMIN C) 500 MG tablet Take 500 mg by mouth once daily.    DOCOSAHEXANOIC ACID (DHA ALGAL-900 ORAL) Take by mouth.    folic acid (FOLVITE) 800 MCG Tab Take 800 mcg by mouth once daily.    meclizine (ANTIVERT) 25 mg tablet Take 1 tablet (25 mg total) by mouth 3 (three) times daily as needed.    ondansetron (ZOFRAN) 4 MG tablet Take 1 tablet (4 mg total) by mouth daily as needed for Nausea.    scopolamine (TRANSDERM-SCOP) 1.5 mg (1 mg over 3 days) Place 1 patch (1.5 mg total) onto the skin every 72 hours.    vitamin A 8000 UNIT capsule Take 8,000 Units by mouth once daily.    ZINC ORAL Take 1 tablet by mouth.       Review of patient's allergies indicates:  No Known Allergies     Family History       Problem  Relation (Age of Onset)    Alzheimer's disease Maternal Grandmother    Cancer Maternal Aunt, Maternal Grandfather, Paternal Grandfather    Cirrhosis Maternal Grandfather    Diabetes Maternal Grandfather, Paternal Grandmother    Heart attacks under age 50 Father    Heart disease Paternal Grandmother    Hypertension Father, Maternal Grandmother, Paternal Grandmother    No Known Problems Sister, Brother          Tobacco Use    Smoking status: Former Smoker     Packs/day: 0.25     Types: Cigarettes     Quit date: 3/2/2017     Years since quittin.0    Smokeless tobacco: Never Used   Substance and Sexual Activity    Alcohol use: Not Currently     Alcohol/week: 0.0 standard drinks     Comment: socially     Drug use: No    Sexual activity: Yes     Partners: Male     Birth control/protection: None     Review of Systems   All other systems reviewed and are negative.   Objective:     Vital Signs (Most Recent):  Temp: 98 °F (36.7 °C) (22 0600) Vital Signs (24h Range):  Temp:  [98 °F (36.7 °C)] 98 °F (36.7 °C)        There is no height or weight on file to calculate BMI.    FHT: 120Cat 1 (reassuring)  TOCO:  rare    Physical Exam:   Constitutional: She is oriented to person, place, and time. She appears well-developed and well-nourished.    HENT:   Head: Normocephalic.    Eyes: Pupils are equal, round, and reactive to light. Conjunctivae are normal.     Cardiovascular:  Normal rate and regular rhythm.             Pulmonary/Chest: Effort normal.        Abdominal: Soft.     Genitourinary:    Genitourinary Comments: gravid             Musculoskeletal: Normal range of motion.       Neurological: She is alert and oriented to person, place, and time. She has normal reflexes.    Skin: Skin is warm and dry.    Psychiatric: She has a normal mood and affect. Her behavior is normal. Judgment and thought content normal.     Cervix deferred    Significant Labs:  Lab Results   Component Value Date    [facility] A POS 2021     HEPBSAG Negative 2021    STREPBCULT No Group B Streptococcus isolated 2022       I have personallly reviewed all pertinent lab results from the last 24 hours.  Recent Lab Results         22  0541        Baso # 0.02       Basophil % 0.3       Differential Method Automated       Eos # 0.1       Eosinophil % 1.6       Gran # (ANC) 4.0       Gran % 58.2       Hematocrit 34.7       Hemoglobin 11.8       Immature Grans (Abs) 0.05  Comment: Mild elevation in immature granulocytes is non specific and   can be seen in a variety of conditions including stress response,   acute inflammation, trauma and pregnancy. Correlation with other   laboratory and clinical findings is essential.         Immature Granulocytes 0.7       Lymph # 2.1       Lymph % 30.4       MCH 30.3       MCHC 34.0       MCV 89       Mono # 0.6       Mono % 8.8       MPV 10.7       nRBC 0       Platelets 193       RBC 3.90       RDW 13.5       WBC 6.91             Assessment/Plan:     36 y.o. female  at 39w0d for:    * History of  section complicating pregnancy  Reviewed risks of c/section, previously reviewed including  Infection, hemorrhage, damage to bowel, bladder, dvt, htn, cva; death, damage to fetus.  All questions answered  Consent signed and witnessed; OR/anesthesia aware  Ancef preop          Randi Rodriguez MD  Obstetrics  [facility]

## 2022-03-02 NOTE — ANESTHESIA PROCEDURE NOTES
Spinal    Diagnosis: IUP C section  Patient location during procedure: OB  Start time: 3/2/2022 7:06 AM  Timeout: 3/2/2022 7:05 AM  End time: 3/2/2022 7:09 AM    Staffing  Authorizing Provider: El Diehl MD  Performing Provider: Willard Roberson CRNA    Preanesthetic Checklist  Completed: patient identified, IV checked, risks and benefits discussed, surgical consent, monitors and equipment checked, pre-op evaluation and timeout performed  Spinal Block  Patient position: sitting  Prep: Betadine  Patient monitoring: cardiac monitor and frequent blood pressure checks  Approach: midline  Location: L3-4  Injection technique: single shot  CSF Fluid: clear free-flowing CSF  Needle  Needle type: pencil-tip   Needle gauge: 25 G  Needle length: 3.5 in  Additional Documentation: incremental injection, negative aspiration for heme and no paresthesia on injection  Needle localization: anatomical landmarks  Assessment  Sensory level: T5   Dermatomal levels determined by pinch or prick  Ease of block: easy  Patient's tolerance of the procedure: comfortable throughout block and no complaints

## 2022-03-02 NOTE — LACTATION NOTE
Lactation rounds:  used for entire encounter. Mother states she breast fed her first infant for 6 months. Her only complication was pain for the first month. Discussed how pain is not normal. Discussed available outpatient lactation resources. Mother denies pain at this time. Encouraged mother to call lactation for assistance if any pain persist.     Mother verbalizes understanding of expected  behaviors and output for the first 48 hours of life.  Discussed the importance of cue based feedings on demand, unrestricted access to the breast, and frequent uninterrupted skin to skin contact.  Risk and implications of artificial nipples and non medically indicated formula supplementation discussed.  Discussed signs of deep asymmetrical latch and effective milk transfer. Questions answered regarding pumping and returning to work. Mother states that she has ordered a Spectra S1 and she is awaiting its arrival.     Mother denies any further lactation needs or concerns at this time. Encouraged mother to call for assistance when desired or when infant is showing signs of hunger. Mother verbalizes understanding of all education and counseling.

## 2022-03-02 NOTE — ASSESSMENT & PLAN NOTE
Reviewed risks of c/section, previously reviewed including  Infection, hemorrhage, damage to bowel, bladder, dvt, htn, cva; death, damage to fetus.  All questions answered  Consent signed and witnessed; OR/anesthesia aware  Ancef preop

## 2022-03-02 NOTE — HOSPITAL COURSE
03/02/2022--elective repeat c/section; mother and infant transferred to [facility] for routine postpartum care  3/4/22-pt without complaints. Stable for discharge.  present

## 2022-03-03 PROCEDURE — 63600175 PHARM REV CODE 636 W HCPCS: Performed by: OBSTETRICS & GYNECOLOGY

## 2022-03-03 PROCEDURE — 25000003 PHARM REV CODE 250: Performed by: OBSTETRICS & GYNECOLOGY

## 2022-03-03 PROCEDURE — 11000001 HC ACUTE MED/SURG PRIVATE ROOM

## 2022-03-03 PROCEDURE — 25000003 PHARM REV CODE 250: Performed by: PHYSICIAN ASSISTANT

## 2022-03-03 RX ORDER — ACETAMINOPHEN 325 MG/1
650 TABLET ORAL EVERY 4 HOURS PRN
Status: DISCONTINUED | OUTPATIENT
Start: 2022-03-03 | End: 2022-03-04 | Stop reason: HOSPADM

## 2022-03-03 RX ADMIN — FERROUS SULFATE TAB 325 MG (65 MG ELEMENTAL FE) 1 EACH: 325 (65 FE) TAB at 08:03

## 2022-03-03 RX ADMIN — IBUPROFEN 800 MG: 800 TABLET, FILM COATED ORAL at 09:03

## 2022-03-03 RX ADMIN — DOCUSATE SODIUM 100 MG: 100 CAPSULE, LIQUID FILLED ORAL at 08:03

## 2022-03-03 RX ADMIN — ACETAMINOPHEN 650 MG: 325 TABLET ORAL at 12:03

## 2022-03-03 RX ADMIN — DOCUSATE SODIUM 100 MG: 100 CAPSULE, LIQUID FILLED ORAL at 09:03

## 2022-03-03 RX ADMIN — IBUPROFEN 800 MG: 800 TABLET, FILM COATED ORAL at 01:03

## 2022-03-03 RX ADMIN — CHLORHEXIDINE GLUCONATE 0.12% ORAL RINSE 10 ML: 1.2 LIQUID ORAL at 08:03

## 2022-03-03 RX ADMIN — PRENATAL VITAMINS-IRON FUMARATE 27 MG IRON-FOLIC ACID 0.8 MG TABLET 1 TABLET: at 08:03

## 2022-03-03 RX ADMIN — KETOROLAC TROMETHAMINE 30 MG: 30 INJECTION, SOLUTION INTRAMUSCULAR at 05:03

## 2022-03-03 RX ADMIN — DOCUSATE SODIUM AND SENNOSIDES 1 TABLET: 8.6; 5 TABLET, FILM COATED ORAL at 09:03

## 2022-03-03 RX ADMIN — HYDROCODONE BITARTRATE AND ACETAMINOPHEN 1 TABLET: 7.5; 325 TABLET ORAL at 09:03

## 2022-03-03 RX ADMIN — CHLORHEXIDINE GLUCONATE 0.12% ORAL RINSE 10 ML: 1.2 LIQUID ORAL at 09:03

## 2022-03-03 NOTE — ASSESSMENT & PLAN NOTE
POD #1 s/p repeat LTCS  Pt doing well, afebrile overnight.  Proceed with routine post-partum care, encouraged ambulation, aware ok to shower.  Pt counseled on management plan and discharge goals. Anticipate discharge in 1-2 days

## 2022-03-03 NOTE — PLAN OF CARE
VSS. Fundus firm; bleeding light. No large clots passed this shift. Measured voids complete. Pt ambulates without difficulty. Scant drainage noted to Aquacel. Pt bonding with infant. POC reviewed with patient and significant other using /YAYO.

## 2022-03-03 NOTE — PLAN OF CARE
Patient doing well. Pain is well controlled on po meds. Lochia is scant and fundus is firm. Breastfeeding seems to be going well. Patient is independent with self care but an  is available if spouse is not there. Bonding well with baby. Will continue to monitor.

## 2022-03-03 NOTE — LACTATION NOTE
This note was copied from a baby's chart.  Lactation Rounds:     Accompanied Primary Nurse, Mayra Little RN, to room at this time. Mother's  interprets this encounter. Mother states that Infant was breastfeeding well then fell asleep. Mother called out for nurse to come weigh infant. Infant is showing feeding cues, mother wants to latch infant to breast again. Nurse allows for mother breastfeed infant.     Witnessed mother positioned infant in cross cradle hold to right breast. Initial latch was noted to be shallow, mother readjusted and repositioned infant to face breast and uses her right hand to bring infant to breast. Infant latches deep to breast. Nutritive sucking with swallows noted. Mother denies pain and discomfort. Mother states having nipple pain breastfeeding her last child (5 y/o). Discussed signs of good latch and effective feeding, and output expectations. Infant voids and stool wnl as of this time.     Mother thinks that infant has started cluster feeding. Discussed importance of responding to early feeding cues and baby-led feeds, frequency of feeds of at least 8 times a day including the normality of cluster feeding, breast massages and hand expression. Discussed normality of infant's sleepiness the first 24 hours of life. Discussed waking techniques to encourage infant to have a full feeding. Discussed use of colostrum as nipple cream. Discussed risk and implications of artifical nipples and non medically indicated formula supplementation.     Mother states she ordered a Spectra S1 breast pump for use at home.     Mother verbalizes understanding of all education and counseling provided. Mother denies and further needs. Infant is still breastfeeding, mother to call for assistance as needed.

## 2022-03-03 NOTE — LACTATION NOTE
Lactation Rounds: infant output and weight loss WNL. Profession  at bedside for entire encounter.    Mother states that she can latch infant well to both breast without difficulty or pain. Mother states she had a blister to the tip of her left nipple after one feeding, but it self resolved; again she denies pain. Mother states that her nipple is normal shaped after a feeding.  Reviewed deep asymmetrical latch technique, signs of good attachment and signs of nutritive feeding. Mother states she feels breastfeeding is going very well. Encouraged mother to call for latch assessment.    Reviewed expected feeding and output pattern for day of life 2. Encouraged frequent skin to skin, unrestricted access to the breast and feeds based on early cues.     Mother verbalizes understanding of all education and counseling. Mother denies any further lactation needs or concerns at this time. Mother to call for assistance as desired.

## 2022-03-03 NOTE — PROGRESS NOTES
[facility] ([facility])  Obstetrics  Postpartum Progress Note    Patient Name: Nicki Larson  MRN: [id number]  Admission Date: 3/2/2022  Hospital Length of Stay: 1 days  Attending Physician: Randi Rodriguez MD  Primary Care Provider: Primary Doctor No    Subjective:     Principal Problem:S/P  section    Hospital Course:  2022--elective repeat c/section; mother and infant transferred to [facility] for routine postpartum care            Interval History:     She is doing well this morning. She is tolerating a regular diet without nausea or vomiting. She is voiding spontaneously. She is ambulating. She has not passed flatus, and has not a BM. Vaginal bleeding is mild. She denies fever or chills. Abdominal pain is mild and controlled with oral medications. She Is breastfeeding.   Objective:     Vital Signs (Most Recent):  Temp: 97.1 °F (36.2 °C) (22)  Pulse: 77 (22)  Resp: 18 (22)  BP: (!) 112/55 (22)  SpO2: 96 % (22)   Vital Signs (24h Range):  Temp:  [97.1 °F (36.2 °C)-98.1 °F (36.7 °C)] 97.1 °F (36.2 °C)  Pulse:  [54-82] 77  Resp:  [18-20] 18  SpO2:  [95 %-97 %] 96 %  BP: ()/(54-69) 112/55        There is no height or weight on file to calculate BMI.      Intake/Output Summary (Last 24 hours) at 3/3/2022 0856  Last data filed at 3/3/2022 0200  Gross per 24 hour   Intake --   Output 2315 ml   Net -2315 ml         Significant Labs:  Lab Results   Component Value Date    GROUPTRH A POS 2022    HEPBSAG Negative 2021    STREPBCULT No Group B Streptococcus isolated 2022     Recent Labs   Lab 22  0541   HGB 11.8*   HCT 34.7*       I have personallly reviewed all pertinent lab results from the last 24 hours.    Physical Exam:   Constitutional: She is oriented to person, place, and time. She appears well-developed and well-nourished. No distress.       Cardiovascular:  Normal rate, regular rhythm, normal heart sounds and  intact distal pulses.            No murmur heard.   Pulmonary/Chest: Effort normal and breath sounds normal. No respiratory distress. She has no wheezes. She has no rales.        Abdominal: Soft. Bowel sounds are normal. She exhibits abdominal incision (Aquacel dressing in place, clear/dry/intact). She exhibits no distension. There is no abdominal tenderness. There is no guarding.   Uterine fundus firm, below umbilicus, mild tenderness (appropriate)             Musculoskeletal: Moves all extremeties. No edema.      Comments: No calf tenderness       Neurological: She is alert and oriented to person, place, and time.    Skin: Skin is warm and dry. No rash noted. She is not diaphoretic.      Assessment/Plan:     36 y.o. female  for:    * S/P  section  POD #1 s/p repeat LTCS  Pt doing well, afebrile overnight.  Proceed with routine post-partum care, encouraged ambulation, aware ok to shower.  Pt counseled on management plan and discharge goals. Anticipate discharge in 1-2 days                  Disposition: As patient meets milestones, will plan to discharge.    Beatrice Kwan PA-C  Obstetrics  [facility] ([facility])

## 2022-03-03 NOTE — SUBJECTIVE & OBJECTIVE
Interval History:     She is doing well this morning. She is tolerating a regular diet without nausea or vomiting. She is voiding spontaneously. She is ambulating. She has not passed flatus, and has not a BM. Vaginal bleeding is mild. She denies fever or chills. Abdominal pain is mild and controlled with oral medications. She Is breastfeeding.   Objective:     Vital Signs (Most Recent):  Temp: 97.1 °F (36.2 °C) (03/03/22 0803)  Pulse: 77 (03/03/22 0803)  Resp: 18 (03/03/22 0803)  BP: (!) 112/55 (03/03/22 0803)  SpO2: 96 % (03/03/22 0803)   Vital Signs (24h Range):  Temp:  [97.1 °F (36.2 °C)-98.1 °F (36.7 °C)] 97.1 °F (36.2 °C)  Pulse:  [54-82] 77  Resp:  [18-20] 18  SpO2:  [95 %-97 %] 96 %  BP: ()/(54-69) 112/55        There is no height or weight on file to calculate BMI.      Intake/Output Summary (Last 24 hours) at 3/3/2022 0856  Last data filed at 3/3/2022 0200  Gross per 24 hour   Intake --   Output 2315 ml   Net -2315 ml         Significant Labs:  Lab Results   Component Value Date    GROUPTRH A POS 03/02/2022    HEPBSAG Negative 07/30/2021    STREPBCULT No Group B Streptococcus isolated 02/11/2022     Recent Labs   Lab 03/02/22  0541   HGB 11.8*   HCT 34.7*       I have personallly reviewed all pertinent lab results from the last 24 hours.    Physical Exam:   Constitutional: She is oriented to person, place, and time. She appears well-developed and well-nourished. No distress.       Cardiovascular:  Normal rate, regular rhythm, normal heart sounds and intact distal pulses.            No murmur heard.   Pulmonary/Chest: Effort normal and breath sounds normal. No respiratory distress. She has no wheezes. She has no rales.        Abdominal: Soft. Bowel sounds are normal. She exhibits abdominal incision (Aquacel dressing in place, clear/dry/intact). She exhibits no distension. There is no abdominal tenderness. There is no guarding.   Uterine fundus firm, below umbilicus, mild tenderness (appropriate)              Musculoskeletal: Moves all extremeties. No edema.      Comments: No calf tenderness       Neurological: She is alert and oriented to person, place, and time.    Skin: Skin is warm and dry. No rash noted. She is not diaphoretic.

## 2022-03-04 VITALS
HEART RATE: 82 BPM | TEMPERATURE: 98 F | DIASTOLIC BLOOD PRESSURE: 76 MMHG | RESPIRATION RATE: 18 BRPM | OXYGEN SATURATION: 99 % | SYSTOLIC BLOOD PRESSURE: 124 MMHG

## 2022-03-04 LAB — BILE AC SERPL-SCNC: 1 MCMOL/L

## 2022-03-04 PROCEDURE — 25000003 PHARM REV CODE 250: Performed by: OBSTETRICS & GYNECOLOGY

## 2022-03-04 RX ORDER — DOCUSATE SODIUM 100 MG/1
100 CAPSULE, LIQUID FILLED ORAL 2 TIMES DAILY
Qty: 20 CAPSULE | Refills: 0 | Status: SHIPPED | OUTPATIENT
Start: 2022-03-04 | End: 2022-03-14

## 2022-03-04 RX ORDER — HYDROCODONE BITARTRATE AND ACETAMINOPHEN 5; 325 MG/1; MG/1
1 TABLET ORAL
Qty: 15 TABLET | Refills: 0 | Status: SHIPPED | OUTPATIENT
Start: 2022-03-04 | End: 2022-04-13 | Stop reason: ALTCHOICE

## 2022-03-04 RX ORDER — SIMETHICONE 80 MG
80 TABLET,CHEWABLE ORAL EVERY 6 HOURS PRN
Qty: 20 TABLET | Refills: 0 | Status: SHIPPED | OUTPATIENT
Start: 2022-03-04 | End: 2022-04-13 | Stop reason: ALTCHOICE

## 2022-03-04 RX ORDER — IBUPROFEN 800 MG/1
800 TABLET ORAL EVERY 8 HOURS
Qty: 21 TABLET | Refills: 0 | Status: SHIPPED | OUTPATIENT
Start: 2022-03-04 | End: 2022-03-11

## 2022-03-04 RX ADMIN — PRENATAL VITAMINS-IRON FUMARATE 27 MG IRON-FOLIC ACID 0.8 MG TABLET 1 TABLET: at 08:03

## 2022-03-04 RX ADMIN — FERROUS SULFATE TAB 325 MG (65 MG ELEMENTAL FE) 1 EACH: 325 (65 FE) TAB at 08:03

## 2022-03-04 RX ADMIN — IBUPROFEN 800 MG: 800 TABLET, FILM COATED ORAL at 05:03

## 2022-03-04 RX ADMIN — CHLORHEXIDINE GLUCONATE 0.12% ORAL RINSE 10 ML: 1.2 LIQUID ORAL at 08:03

## 2022-03-04 RX ADMIN — DOCUSATE SODIUM 100 MG: 100 CAPSULE, LIQUID FILLED ORAL at 08:03

## 2022-03-04 RX ADMIN — SIMETHICONE 80 MG: 80 TABLET, CHEWABLE ORAL at 08:03

## 2022-03-04 NOTE — PLAN OF CARE
Patient afebrile and had no falls this shift. Fundus firm without massage and below umbilicus. Bleeding light, no clots passed this shift. Aquacel dry with no drainage. Voids spontaneously. Ambulates independently. Pain well controlled with oral pain medication. Vital signs stable at this time. Bonding well with infant; responds to infant cues and participates in infant care. AVS sheet reviewed. Educated on self care, follow up appointments, incisional care, infection prevention, Nozin use, Hibiclens use, and signs and symptoms of preeclampsia. Verbalized understanding. Ready for discharge.

## 2022-03-04 NOTE — PROGRESS NOTES
[facility] ([facility])  Obstetrics  Postpartum Progress Note    Patient Name: Nicki Larson  MRN: [id number]  Admission Date: 3/2/2022  Hospital Length of Stay: 2 days  Attending Physician: Randi Rodriguez MD  Primary Care Provider: Primary Doctor No    Subjective:     Principal Problem:S/P  section    Hospital Course:  2022--elective repeat c/section; mother and infant transferred to [facility] for routine postpartum care  3/4/22-pt without complaints. Stable for discharge.  present          Interval History: s/p repeat csection secondary to failed EULOGIO    She is doing well this morning. She is tolerating a regular diet without nausea or vomiting. She is voiding spontaneously. She is ambulating. She has passed flatus, and has not a BM. Vaginal bleeding is mild. She denies fever or chills. Abdominal pain is moderate and controlled with oral medications. She Is breastfeeding. She desires circumcision for her male baby: not applicable.    Objective:     Vital Signs (Most Recent):  Temp: 98.6 °F (37 °C) (22 07)  Pulse: 71 (22 07)  Resp: 18 (22)  BP: 111/61 (22)  SpO2: 97 % (22) Vital Signs (24h Range):  Temp:  [97.1 °F (36.2 °C)-98.6 °F (37 °C)] 98.6 °F (37 °C)  Pulse:  [56-71] 71  Resp:  [18] 18  SpO2:  [97 %-100 %] 97 %  BP: ()/(56-76) 111/61        There is no height or weight on file to calculate BMI.    No intake or output data in the 24 hours ending 22 0948      Significant Labs:  Lab Results   Component Value Date    GROUPTRH A POS 2022    HEPBSAG Negative 2021    STREPBCULT No Group B Streptococcus isolated 2022     No results for input(s): HGB, HCT in the last 48 hours.    I have personallly reviewed all pertinent lab results from the last 24 hours.    Physical Exam  GENERAL: NAD, A&O  CHEST: normal effort  ABDOMEN: fundus firm, tenderness approp. Bandage c/d/I  : scant bleeding  EXT: no  edema        Assessment/Plan:     36 y.o. female  for:    * S/P  section  POD #2 s/p repeat LTCS            Usher syndrome             Disposition: As patient meets milestones, will plan to discharge today.    Evelia Navarro MD  Obstetrics  [facility] ([facility])

## 2022-03-04 NOTE — DISCHARGE SUMMARY
[facility])  Obstetrics  Discharge Summary      Patient Name: Nicki Larson  MRN: [id number]  Admission Date: 3/2/2022  Hospital Length of Stay: 2 days  Discharge Date and Time: 3/4/22  Attending Physician: Randi Rodriguez MD   Discharging Provider: Evelia Navarro MD   Primary Care Provider: Primary Doctor No    HPI: 37 y/o  at 39w for elective repeat c/section          Procedure(s) (LRB):   SECTION (N/A)     Hospital Course:   2022--elective repeat c/section; mother and infant transferred to [facility] for routine postpartum care  3/4/22-pt without complaints. Stable for discharge.  present               Final Active Diagnoses:    Diagnosis Date Noted POA    PRINCIPAL PROBLEM:  S/P  section [Z98.891] 2017 Not Applicable      Problems Resolved During this Admission:        Significant Diagnostic Studies: Labs: All labs within the past 24 hours have been reviewed      Feeding Method: breast    Immunizations     None          Delivery:    Episiotomy: None   Lacerations: None   Repair suture: None   Repair # of packets: 0   Blood loss (ml):       Birth information:  YOB: 2023   Time of birth: 7:31 AM   Sex: female   Delivery type: , Low Transverse   Gestational Age: 39w0d    Delivery Clinician:      Other providers:       Additional  information:  Forceps:    Vacuum:    Breech:    Observed anomalies      Living?:           APGARS  One minute Five minutes Ten minutes   Skin color:         Heart rate:         Grimace:         Muscle tone:         Breathing:         Totals: 9  9        Placenta: Delivered:       appearance    Pending Diagnostic Studies:     None          Discharged Condition: good    Disposition: Home or Self Care    Follow Up:   Follow-up Information     [facility] Follow up in 1 week(s).    Why: For dressing removal           Randi Rodriguez MD Follow up in 5 week(s).    Specialty: Obstetrics and  Gynecology  Why: post partum  Contact information:  [address] [facility] [address] [phone]                       Patient Instructions:   6 weeks pelvic rest    Medications:  Current Discharge Medication List      START taking these medications    Details   docusate sodium (COLACE) 100 MG capsule Take 1 capsule (100 mg total) by mouth 2 (two) times daily. for 20 doses  Qty: 20 capsule, Refills: 0      HYDROcodone-acetaminophen (NORCO) 5-325 mg per tablet Take 1 tablet by mouth every 6 to 8 hours as needed for Pain.  Qty: 15 tablet, Refills: 0    Comments: Quantity prescribed more than 7 day supply? No      ibuprofen (ADVIL,MOTRIN) 800 MG tablet Take 1 tablet (800 mg total) by mouth every 8 (eight) hours. for 7 days  Qty: 21 tablet, Refills: 0      lanolin Crea cream Apply topically as needed for Dry Skin (Apply to nipples for soreness).  Qty: 28 g, Refills: 0      PNV,calcium 72/iron/folic acid (PRENATAL VITAMIN) Tab Take 1 tablet by mouth once daily.  Qty: 30 tablet, Refills: 11      simethicone (MYLICON) 80 MG chewable tablet Take 1 tablet (80 mg total) by mouth every 6 (six) hours as needed for Flatulence.  Qty: 20 tablet, Refills: 0         CONTINUE these medications which have NOT CHANGED    Details   ascorbic acid, vitamin C, (VITAMIN C) 500 MG tablet Take 500 mg by mouth once daily.      DOCOSAHEXANOIC ACID (DHA ALGAL-900 ORAL) Take by mouth.      folic acid (FOLVITE) 800 MCG Tab Take 800 mcg by mouth once daily.      meclizine (ANTIVERT) 25 mg tablet Take 1 tablet (25 mg total) by mouth 3 (three) times daily as needed.  Qty: 30 tablet, Refills: 0    Associated Diagnoses: Vertigo      vitamin A 8000 UNIT capsule Take 8,000 Units by mouth once daily.      ZINC ORAL Take 1 tablet by mouth.         STOP taking these medications       ondansetron (ZOFRAN) 4 MG tablet Comments:   Reason for Stopping:         scopolamine (TRANSDERM-SCOP) 1.5 mg (1 mg over 3 days) Comments:   Reason for Stopping:                Evelia Navarro MD  Obstetrics  [facility] ([facility])

## 2022-03-04 NOTE — SUBJECTIVE & OBJECTIVE
Interval History: s/p repeat csection secondary to failed EULOGIO    She is doing well this morning. She is tolerating a regular diet without nausea or vomiting. She is voiding spontaneously. She is ambulating. She has passed flatus, and has not a BM. Vaginal bleeding is mild. She denies fever or chills. Abdominal pain is moderate and controlled with oral medications. She Is breastfeeding. She desires circumcision for her male baby: not applicable.    Objective:     Vital Signs (Most Recent):  Temp: 98.6 °F (37 °C) (03/04/22 0735)  Pulse: 71 (03/04/22 0735)  Resp: 18 (03/04/22 0735)  BP: 111/61 (03/04/22 0735)  SpO2: 97 % (03/04/22 0735) Vital Signs (24h Range):  Temp:  [97.1 °F (36.2 °C)-98.6 °F (37 °C)] 98.6 °F (37 °C)  Pulse:  [56-71] 71  Resp:  [18] 18  SpO2:  [97 %-100 %] 97 %  BP: ()/(56-76) 111/61        There is no height or weight on file to calculate BMI.    No intake or output data in the 24 hours ending 03/04/22 0948      Significant Labs:  Lab Results   Component Value Date    GROUPTRH A POS 03/02/2022    HEPBSAG Negative 07/30/2021    STREPBCULT No Group B Streptococcus isolated 02/11/2022     No results for input(s): HGB, HCT in the last 48 hours.    I have personallly reviewed all pertinent lab results from the last 24 hours.    Physical Exam  GENERAL: NAD, A&O  CHEST: normal effort  ABDOMEN: fundus firm, tenderness approp. Bandage c/d/I  : scant bleeding  EXT: no edema

## 2022-03-04 NOTE — DISCHARGE INSTRUCTIONS
"Mother Self Care:    Activity: Avoid strenuous exercise and get adequate rest.  No driving until the physician consent given.  Emotional Changes: Most women find birth to be a time of great emotional upheaval.  Sense of loss, mood swings, fatigue, anxiety, and feeling "let down" are common.  If feelings worsen or last more than a week, call your physician.  Breast Care/Breastfeeding: Wear a bra for comfort.  Keep nipples dry and apply your own breast milk or lanolin cream as needed for soreness.  Engorgement can be relieved with warm, moist heat before feedings.  You may also take Ibuprofen.  Higinio-Care/Vaginal Bleeding: Remember to use your higinio-bottle after urinating.  Your flow will change from red, to pink, to yellow/white color over a period of 2 weeks.  Menstruation will return in 3-8 weeks, or longer if breastfeeding.   Section/Tubal Ligation: Keep incision clean and dry. You may shower, but avoid baths.  Sexual Activity/Pelvic Rest: No sexual activity, tampons, or douching until your physician gives you consent.  Diet: Continue to eat from the five basic food groups, including plenty of protein, fruits, vegetables, and whole grains.  Limit empty calories and high fat foods.  Drink enough fluids to satisfy thirst and add an extra 500 calories for breastfeeding.  Constipation/Hemorrhoids: Drink plenty of water.  You may take a stool softener or natural laxative (Metamucil). You may use tucks or hemorrhoid ointment and soak in a warm tub.    CALL YOUR OB DOCTOR IF ANY OF THE FOLLOWING OCCURS:  *Heavy bleeding - saturating a pad an hour or passing any large (2-3 inches in size) blood clots.  *Any pain, redness, or tenderness in lower leg.  *You cannot care for yourself or your baby.  *Any signs of infection-      - Temperature greater than 100.5 degrees F      - Foul smelling vaginal discharge and/or incisional drainage      - Increased episiotomy or incisional pain      - Hot, hard, red or sore area on " breast      - Flu-like symptoms      - Any urgency, frequency or burning with urination    Return To the Hospital for further Evaluation:  Headache not relieved by tylenol or ibuprofen  Blurry vision, double vision, seeing spots, or flashing lights  Feeling faint or passing out  Right epigastric pain  Difficulty breathing  Swelling in hands, face, or feet  Any of these symptoms accompanied by nausea/vomiting  Gaining more than 5 pounds in one week  Seizures  These symptoms could be an indication of elevated blood pressure.       If you have any questions that need to be answered immediately please call the Labor & Delivery Unit at [phone] and ask to speak to a nurse.

## 2022-03-04 NOTE — LACTATION NOTE
Presence of .   Mother states that breastfeeding is going well. She denies any concerns. possible discharge today.  Lactation discharge education reviewed with patient, including expectations for feeding and output, first alert form, engorgement/mastitis, diet/medications ([facility]), support groups/warmline, etc. Patient verbalized understanding of education.

## 2022-03-04 NOTE — PLAN OF CARE
Will continue to monitor self care and care of infant. Will continue to monitor pain and distress. No c/o pain or apparent distress noted at this time.  available to patient and staff. Will continue to monitor all needs.

## 2022-03-10 ENCOUNTER — POSTPARTUM VISIT (OUTPATIENT)
Dept: OBSTETRICS AND GYNECOLOGY | Facility: CLINIC | Age: 37
End: 2022-03-10
Payer: COMMERCIAL

## 2022-03-10 VITALS
HEIGHT: 63 IN | WEIGHT: 178.56 LBS | BODY MASS INDEX: 31.64 KG/M2 | SYSTOLIC BLOOD PRESSURE: 120 MMHG | DIASTOLIC BLOOD PRESSURE: 80 MMHG

## 2022-03-10 DIAGNOSIS — Z98.891 S/P CESAREAN SECTION: Primary | ICD-10-CM

## 2022-03-10 PROCEDURE — 0503F PR POSTPARTUM CARE VISIT: ICD-10-PCS | Mod: CPTII,S$GLB,, | Performed by: OBSTETRICS & GYNECOLOGY

## 2022-03-10 PROCEDURE — 0503F POSTPARTUM CARE VISIT: CPT | Mod: CPTII,S$GLB,, | Performed by: OBSTETRICS & GYNECOLOGY

## 2022-03-10 PROCEDURE — 99999 PR PBB SHADOW E&M-EST. PATIENT-LVL III: CPT | Mod: PBBFAC,,,

## 2022-03-10 PROCEDURE — 99999 PR PBB SHADOW E&M-EST. PATIENT-LVL III: ICD-10-PCS | Mod: PBBFAC,,,

## 2022-03-10 NOTE — PROGRESS NOTES
Subjective:       Patient ID: Nicki Larson is a 36 y.o. female.    Chief Complaint:  Post-op Evaluation      History of Present Illness  HPI  Postoperative Follow-up  Patient presents to the clinic 1 weeks status post  for post op follow up and dressing removal. Eating a regular diet without difficulty. Bowel movements are normal. Pain is controlled with current analgesics. Medications being used: ibuprofen (OTC) and doesn't find she needs to take the ibuprofen.      GYN & OB History  Patient's last menstrual period was 2021.   Date of Last Pap: No result found    OB History    Para Term  AB Living   2 2 2     2   SAB IAB Ectopic Multiple Live Births         0 2      # Outcome Date GA Lbr Bhupendra/2nd Weight Sex Delivery Anes PTL Lv   2 Term 22 39w0d  3.36 kg (7 lb 6.5 oz) F CS-LTranv Spinal N ZAIRE   1 Term 17 39w6d  3.86 kg (8 lb 8.2 oz) F CS-LTranv Spinal N ZAIRE       Review of Systems  Review of Systems   Constitutional: Negative for activity change, chills, fatigue and fever.   Respiratory: Negative for cough.    Cardiovascular: Negative for chest pain and leg swelling.   Gastrointestinal: Negative for abdominal pain, constipation, nausea and vomiting.   Genitourinary: Negative for dysuria, frequency, hematuria, pelvic pain, urgency, vaginal bleeding and vaginal discharge.   Integumentary:  Negative for rash.           Objective:    Physical Exam:   Constitutional: She is oriented to person, place, and time. She appears well-developed and well-nourished. No distress.       Cardiovascular: Normal rate.     Pulmonary/Chest: Effort normal. No respiratory distress.        Abdominal: Soft. She exhibits abdominal incision (Aquacel dressing in place, clear/dry/intact; dressing removed and incision is intact and healing well). She exhibits no distension. There is no abdominal tenderness. There is no guarding.             Musculoskeletal: Moves all extremeties. No edema.       Comments:         Neurological: She is alert and oriented to person, place, and time.    Skin: Skin is warm and dry. No rash noted. She is not diaphoretic.           Problem List Items Addressed This Visit        Obstetric    S/P  section - Primary        Patient doing well post op  Patient is scheduled for postpartum visit with Dr Rodriguez.    Reviewed all restrictions & limitations with the patient. Advised to call clinic in event of fever, redness or drainage around the incision, worsening pain, or any any concerning issues or symptoms.  Instructed the importance of showering daily, no tub baths, using an antibacterial soap.  Patient verbalized understanding.

## 2022-03-29 ENCOUNTER — TELEPHONE (OUTPATIENT)
Dept: OBSTETRICS AND GYNECOLOGY | Facility: CLINIC | Age: 37
End: 2022-03-29
Payer: COMMERCIAL

## 2022-03-29 NOTE — TELEPHONE ENCOUNTER
----- Message from Malathi Brennan sent at 3/29/2022 11:35 AM CDT -----  Regarding: paperwork  Contact: patient  Patient needs to speak to nurse about jury duty paperwork, patient needs a letter stating that she breast feeds and uses a pump,please call her back at [phone] video relay #

## 2022-04-12 ENCOUNTER — PATIENT MESSAGE (OUTPATIENT)
Dept: OBSTETRICS AND GYNECOLOGY | Facility: CLINIC | Age: 37
End: 2022-04-12
Payer: COMMERCIAL

## 2022-04-13 ENCOUNTER — POSTPARTUM VISIT (OUTPATIENT)
Dept: OBSTETRICS AND GYNECOLOGY | Facility: CLINIC | Age: 37
End: 2022-04-13
Payer: COMMERCIAL

## 2022-04-13 VITALS
BODY MASS INDEX: 30.79 KG/M2 | SYSTOLIC BLOOD PRESSURE: 100 MMHG | HEIGHT: 63 IN | DIASTOLIC BLOOD PRESSURE: 76 MMHG | WEIGHT: 173.75 LBS

## 2022-04-13 DIAGNOSIS — N89.8 VAGINAL ODOR: ICD-10-CM

## 2022-04-13 DIAGNOSIS — Z30.09 ENCOUNTER FOR OTHER GENERAL COUNSELING OR ADVICE ON CONTRACEPTION: ICD-10-CM

## 2022-04-13 DIAGNOSIS — Z98.891 S/P CESAREAN SECTION: ICD-10-CM

## 2022-04-13 PROBLEM — O09.523 MULTIGRAVIDA OF ADVANCED MATERNAL AGE IN THIRD TRIMESTER: Status: RESOLVED | Noted: 2022-02-02 | Resolved: 2022-04-13

## 2022-04-13 PROBLEM — O99.810 ABNORMAL GLUCOSE IN PREGNANCY, ANTEPARTUM: Status: RESOLVED | Noted: 2021-12-16 | Resolved: 2022-04-13

## 2022-04-13 PROCEDURE — 0503F PR POSTPARTUM CARE VISIT: ICD-10-PCS | Mod: CPTII,S$GLB,, | Performed by: OBSTETRICS & GYNECOLOGY

## 2022-04-13 PROCEDURE — 99999 PR PBB SHADOW E&M-EST. PATIENT-LVL III: ICD-10-PCS | Mod: PBBFAC,,, | Performed by: OBSTETRICS & GYNECOLOGY

## 2022-04-13 PROCEDURE — 87481 CANDIDA DNA AMP PROBE: CPT | Mod: 59 | Performed by: OBSTETRICS & GYNECOLOGY

## 2022-04-13 PROCEDURE — 0503F POSTPARTUM CARE VISIT: CPT | Mod: CPTII,S$GLB,, | Performed by: OBSTETRICS & GYNECOLOGY

## 2022-04-13 PROCEDURE — 99999 PR PBB SHADOW E&M-EST. PATIENT-LVL III: CPT | Mod: PBBFAC,,, | Performed by: OBSTETRICS & GYNECOLOGY

## 2022-04-13 PROCEDURE — 87801 DETECT AGNT MULT DNA AMPLI: CPT | Performed by: OBSTETRICS & GYNECOLOGY

## 2022-04-13 NOTE — PROGRESS NOTES
Subjective:       Patient ID: Nicki Larson is a 36 y.o. female.    Chief Complaint:  Postpartum Care      History of Present Illness  HPI  Postoperative Follow-up  Patient presents to the clinic 5 weeks status post  for elective repeat c/section. Eating a regular diet without difficulty. Bowel movements are normal. The patient is not having any pain.  Breast feeding  Emotionally well  Minimal lochia  No intercourse yet  Plans to return to work in 2 wks  GYN & OB History  Patient's last menstrual period was 2021.   Date of Last Pap: No result found    OB History    Para Term  AB Living   2 2 2     2   SAB IAB Ectopic Multiple Live Births         0 2      # Outcome Date GA Lbr Bhupendra/2nd Weight Sex Delivery Anes PTL Lv   2 Term 22 39w0d  3.36 kg (7 lb 6.5 oz) F CS-LTranv Spinal N ZAIRE   1 Term 17 39w6d  3.86 kg (8 lb 8.2 oz) F CS-LTranv Spinal N ZAIRE       Review of Systems  Review of Systems   Genitourinary: Positive for vaginal odor.   All other systems reviewed and are negative.          Objective:      Physical Exam:   Constitutional: She appears well-developed.     Eyes: Pupils are equal, round, and reactive to light. Conjunctivae and EOM are normal.      Pulmonary/Chest: Effort normal. Right breast exhibits no mass, no nipple discharge, no skin change, no tenderness and presence. Left breast exhibits no mass, no nipple discharge, no skin change, no tenderness and presence. Breasts are symmetrical.        Abdominal: Soft.     Genitourinary:    Inguinal canal, uterus, right adnexa, left adnexa and rectum normal.      Pelvic exam was performed with patient supine.   Labial bartholins normal.Cervix is normal. There is vaginal discharge (menstrual) in the vagina.           Musculoskeletal: Normal range of motion.       Neurological: She is alert.    Skin: Skin is warm.    Psychiatric: She has a normal mood and affect.           Assessment:        1. Postpartum care  following  delivery    2. Vaginal odor    3. S/P  section    4. Encounter for other general counseling or advice on contraception               Plan:      Released from ob care  Reviewed contraceptive options--ocp, depo, nuva ring, nexplanon, iud, patch, tl, vasectomy for partner  Reviewed uses of each, risks and benefits.  Pt elects trial of condoms for now  Affirm today if bv, prefers pills  ww exam due after 2022

## 2022-04-18 LAB
BACTERIAL VAGINOSIS DNA: POSITIVE
CANDIDA GLABRATA DNA: NEGATIVE
CANDIDA KRUSEI DNA: NEGATIVE
CANDIDA RRNA VAG QL PROBE: NEGATIVE
T VAGINALIS RRNA GENITAL QL PROBE: NEGATIVE

## 2022-04-19 DIAGNOSIS — N76.0 BACTERIAL VAGINOSIS: Primary | ICD-10-CM

## 2022-04-19 DIAGNOSIS — B96.89 BACTERIAL VAGINOSIS: Primary | ICD-10-CM

## 2022-04-19 RX ORDER — METRONIDAZOLE 500 MG/1
500 TABLET ORAL EVERY 12 HOURS
Qty: 14 TABLET | Refills: 0 | Status: SHIPPED | OUTPATIENT
Start: 2022-04-19 | End: 2022-04-26

## 2022-04-21 ENCOUNTER — PATIENT MESSAGE (OUTPATIENT)
Dept: OBSTETRICS AND GYNECOLOGY | Facility: CLINIC | Age: 37
End: 2022-04-21
Payer: COMMERCIAL

## 2022-04-21 RX ORDER — DICLOXACILLIN SODIUM 250 MG/1
250 CAPSULE ORAL EVERY 6 HOURS
Qty: 28 CAPSULE | Refills: 0 | Status: SHIPPED | OUTPATIENT
Start: 2022-04-21 | End: 2022-04-28 | Stop reason: SDUPTHER

## 2022-04-28 ENCOUNTER — PATIENT MESSAGE (OUTPATIENT)
Dept: OBSTETRICS AND GYNECOLOGY | Facility: CLINIC | Age: 37
End: 2022-04-28
Payer: COMMERCIAL

## 2022-04-28 ENCOUNTER — OFFICE VISIT (OUTPATIENT)
Dept: OBSTETRICS AND GYNECOLOGY | Facility: CLINIC | Age: 37
End: 2022-04-28
Payer: COMMERCIAL

## 2022-04-28 ENCOUNTER — TELEPHONE (OUTPATIENT)
Dept: OBSTETRICS AND GYNECOLOGY | Facility: CLINIC | Age: 37
End: 2022-04-28
Payer: COMMERCIAL

## 2022-04-28 VITALS
HEIGHT: 63 IN | SYSTOLIC BLOOD PRESSURE: 118 MMHG | BODY MASS INDEX: 31.25 KG/M2 | WEIGHT: 176.38 LBS | DIASTOLIC BLOOD PRESSURE: 82 MMHG

## 2022-04-28 PROCEDURE — 99213 OFFICE O/P EST LOW 20 MIN: CPT | Mod: S$GLB,,, | Performed by: NURSE PRACTITIONER

## 2022-04-28 PROCEDURE — 1159F PR MEDICATION LIST DOCUMENTED IN MEDICAL RECORD: ICD-10-PCS | Mod: CPTII,S$GLB,, | Performed by: NURSE PRACTITIONER

## 2022-04-28 PROCEDURE — 3008F BODY MASS INDEX DOCD: CPT | Mod: CPTII,S$GLB,, | Performed by: NURSE PRACTITIONER

## 2022-04-28 PROCEDURE — 1160F RVW MEDS BY RX/DR IN RCRD: CPT | Mod: CPTII,S$GLB,, | Performed by: NURSE PRACTITIONER

## 2022-04-28 PROCEDURE — 3079F DIAST BP 80-89 MM HG: CPT | Mod: CPTII,S$GLB,, | Performed by: NURSE PRACTITIONER

## 2022-04-28 PROCEDURE — 3074F SYST BP LT 130 MM HG: CPT | Mod: CPTII,S$GLB,, | Performed by: NURSE PRACTITIONER

## 2022-04-28 PROCEDURE — 99999 PR PBB SHADOW E&M-EST. PATIENT-LVL III: CPT | Mod: PBBFAC,,, | Performed by: NURSE PRACTITIONER

## 2022-04-28 PROCEDURE — 3008F PR BODY MASS INDEX (BMI) DOCUMENTED: ICD-10-PCS | Mod: CPTII,S$GLB,, | Performed by: NURSE PRACTITIONER

## 2022-04-28 PROCEDURE — 1160F PR REVIEW ALL MEDS BY PRESCRIBER/CLIN PHARMACIST DOCUMENTED: ICD-10-PCS | Mod: CPTII,S$GLB,, | Performed by: NURSE PRACTITIONER

## 2022-04-28 PROCEDURE — 3074F PR MOST RECENT SYSTOLIC BLOOD PRESSURE < 130 MM HG: ICD-10-PCS | Mod: CPTII,S$GLB,, | Performed by: NURSE PRACTITIONER

## 2022-04-28 PROCEDURE — 99213 PR OFFICE/OUTPT VISIT, EST, LEVL III, 20-29 MIN: ICD-10-PCS | Mod: S$GLB,,, | Performed by: NURSE PRACTITIONER

## 2022-04-28 PROCEDURE — 99999 PR PBB SHADOW E&M-EST. PATIENT-LVL III: ICD-10-PCS | Mod: PBBFAC,,, | Performed by: NURSE PRACTITIONER

## 2022-04-28 PROCEDURE — 3079F PR MOST RECENT DIASTOLIC BLOOD PRESSURE 80-89 MM HG: ICD-10-PCS | Mod: CPTII,S$GLB,, | Performed by: NURSE PRACTITIONER

## 2022-04-28 PROCEDURE — 1159F MED LIST DOCD IN RCRD: CPT | Mod: CPTII,S$GLB,, | Performed by: NURSE PRACTITIONER

## 2022-04-28 RX ORDER — DICLOXACILLIN SODIUM 250 MG/1
250 CAPSULE ORAL EVERY 6 HOURS
Qty: 12 CAPSULE | Refills: 0 | Status: SHIPPED | OUTPATIENT
Start: 2022-04-28 | End: 2022-05-01

## 2022-04-28 NOTE — TELEPHONE ENCOUNTER
Called pt to Scheduled Appt for breast exam, no answer through relay svc, Left VM Portal message sent

## 2022-04-28 NOTE — PROGRESS NOTES
Subjective:       Patient ID: Nicki Larson is a 36 y.o. female.    Chief Complaint:  Mastitis    No LMP recorded.  History of Present Illness  Patient presents to clinic for follow-up on mastitis. Patient was started on Dicloxacillin on 22 for mastitis of the right breast.  Patient reports improvement in symptoms to right breast although has now begin experiencing pain to left breast x 2 days. Patient reports onset of pain with subsequent onset of chills and body aches. Continues to take antibiotic as prescribed. Reports improvement in symptoms today, although continuing to experience some discomfort with palpation of left breast. Reports pumping or putting baby to breast every 2-3 hours. Does report engorgement due to not pumping or breast feeding 1 week ago while driving to [place], which is when onset of symptoms occurred.    OB History    Para Term  AB Living   2 2 2     2   SAB IAB Ectopic Multiple Live Births         0 2      # Outcome Date GA Lbr Bhupendra/2nd Weight Sex Delivery Anes PTL Lv   2 Term 22 39w0d  3.36 kg (7 lb 6.5 oz) F CS-LTranv Spinal N ZAIRE   1 Term 17 39w6d  3.86 kg (8 lb 8.2 oz) F CS-LTranv Spinal N ZAIRE       Review of Systems  Review of Systems   Constitutional: Negative for appetite change, fatigue and fever.   Gastrointestinal: Negative for abdominal pain, bloating, constipation, diarrhea, nausea and vomiting.   Genitourinary: Negative for bladder incontinence, dysmenorrhea, dyspareunia, dysuria, flank pain, frequency, genital sores, menorrhagia, menstrual problem, pelvic pain, urgency, vaginal bleeding, vaginal discharge, vaginal pain, postcoital bleeding, vaginal dryness and vaginal odor.   Integumentary:  Positive for breast skin changes and breast tenderness. Negative for breast mass.   All other systems reviewed and are negative.  Breast: Positive for breast self exam, lump, skin changes and tenderness.Negative for asymmetry, mass and mastodynia            Objective:      Physical Exam:   Constitutional: She is oriented to person, place, and time. She appears well-developed and well-nourished.    HENT:   Head: Normocephalic and atraumatic.    Eyes: Pupils are equal, round, and reactive to light. Conjunctivae and EOM are normal.     Cardiovascular: Normal rate, regular rhythm and normal heart sounds.     Pulmonary/Chest: Effort normal. Right breast exhibits nipple discharge. Right breast exhibits no inverted nipple, no mass, no skin change, no tenderness, no bleeding and no swelling. Left breast exhibits mass, nipple discharge, skin change and tenderness. Left breast exhibits no inverted nipple, no bleeding and no swelling. Breasts are symmetrical.            Abdominal: Soft. There is no abdominal tenderness.     Genitourinary:    Genitourinary Comments: deferred             Musculoskeletal: Normal range of motion and moves all extremeties.       Neurological: She is alert and oriented to person, place, and time.    Skin: Skin is warm and dry. She is not diaphoretic.    Psychiatric: She has a normal mood and affect. Her behavior is normal. Judgment and thought content normal.            Assessment:     1. Mastitis associated with childbirth, delivered              Plan:   Nicki was seen today for mastitis.    Diagnoses and all orders for this visit:    Mastitis associated with childbirth, delivered  -     dicloxacillin (DYNAPEN) 250 MG capsule; Take 1 capsule (250 mg total) by mouth every 6 (six) hours. for 3 days      Since patient having improvement on current antibiotic, will extend for 10 days total.   If worsening of symptoms, recommend ER evaluation.     F/u with me on Monday. If no improvement, may consider imaging and alternative abx.   Discussed prevention of engorgement, pumping or placing baby to breast every 2-3 hours.

## 2022-04-28 NOTE — TELEPHONE ENCOUNTER
Received phone call from patient. Patient stated that she received a link in [employer] to schedule an appointment regarding mastitis, but the next available appointment was July. After reviewing patient's messages, scheduled patient with NP at the [facility] for breast exam. I contacted deaf resources, and  should be available to assist patient with appointment.

## 2022-11-29 ENCOUNTER — PATIENT MESSAGE (OUTPATIENT)
Dept: PRIMARY CARE CLINIC | Facility: CLINIC | Age: 37
End: 2022-11-29
Payer: COMMERCIAL

## (undated) DEVICE — DRESSING AQUACEL AG 3.5X10IN

## (undated) DEVICE — PAD ABD 8X10 STERILE

## (undated) DEVICE — DRESSING AQUACEL AG ADV 3.5X6